# Patient Record
Sex: FEMALE | Race: WHITE | NOT HISPANIC OR LATINO | Employment: STUDENT | URBAN - METROPOLITAN AREA
[De-identification: names, ages, dates, MRNs, and addresses within clinical notes are randomized per-mention and may not be internally consistent; named-entity substitution may affect disease eponyms.]

---
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
## 2023-01-05 NOTE — PROGRESS NOTES
"Subjective:       Patient ID: Kalyn Kirby is a 16 y.o. female.    Vitals:  height is 5' 9" (1.753 m) and weight is 127 kg (280 lb). Her temperature is 98.7 °F (37.1 °C). Her blood pressure is 136/83 and her pulse is 97. Her respiration is 18 and oxygen saturation is 98%.     Chief Complaint: Cough, Sore Throat, Nasal Congestion, and Headache    Yesterday with cough, congestion, headache nausea. siblings with similar sx. No fever, sob. Hx of asthma doesn't need more of her inhaler. No tx yet otc. covid + in jan    Cough  This is a new problem. The current episode started yesterday. The problem has been unchanged. The problem occurs every few minutes. The cough is productive of sputum (clear ). Associated symptoms include headaches, nasal congestion and postnasal drip. Pertinent negatives include no chills, fever, shortness of breath, weight loss or wheezing. Nothing aggravates the symptoms. She has tried nothing for the symptoms.       Constitution: Negative for chills and fever.   HENT: Positive for postnasal drip.    Respiratory: Positive for cough and asthma. Negative for chest tightness, shortness of breath and wheezing.    Gastrointestinal: Positive for nausea.   Allergic/Immunologic: Positive for asthma.   Neurological: Positive for headaches.       Objective:      Physical Exam   Constitutional: She is oriented to person, place, and time. She appears well-developed. She is cooperative.  Non-toxic appearance. She does not appear ill. No distress.   HENT:   Head: Normocephalic and atraumatic.   Ears:   Right Ear: Hearing, tympanic membrane, external ear and ear canal normal. Tympanic membrane is not erythematous. No middle ear effusion.   Left Ear: Hearing, tympanic membrane, external ear and ear canal normal. Tympanic membrane is not erythematous.  No middle ear effusion.   Nose: Nose normal. No mucosal edema, rhinorrhea or nasal deformity. No epistaxis. Right sinus exhibits no maxillary sinus tenderness " and no frontal sinus tenderness. Left sinus exhibits no maxillary sinus tenderness and no frontal sinus tenderness.   Mouth/Throat: Uvula is midline, oropharynx is clear and moist and mucous membranes are normal. No trismus in the jaw. Normal dentition. No uvula swelling. No oropharyngeal exudate, posterior oropharyngeal edema, posterior oropharyngeal erythema, tonsillar abscesses or cobblestoning.   Eyes: Conjunctivae and lids are normal. No scleral icterus.   Neck: Trachea normal and phonation normal. Neck supple. No edema present. No erythema present. No neck rigidity present.   Cardiovascular: Normal rate, regular rhythm, normal heart sounds and normal pulses.   Pulmonary/Chest: Effort normal and breath sounds normal. No accessory muscle usage. No tachypnea. No respiratory distress. She has no decreased breath sounds. She has no wheezes. She has no rhonchi. She has no rales.   NAD able to speak in clear complete sentences without difficulty  Mild cough on exam    Comments: NAD able to speak in clear complete sentences without difficulty  Mild cough on exam    Abdominal: Normal appearance.   Musculoskeletal: Normal range of motion.         General: No deformity. Normal range of motion.   Lymphadenopathy:     She has no cervical adenopathy.   Neurological: She is alert and oriented to person, place, and time. She exhibits normal muscle tone. Coordination normal.   Skin: Skin is warm, dry, intact, not diaphoretic and not pale.   Psychiatric: Her speech is normal and behavior is normal. Judgment and thought content normal.   Nursing note and vitals reviewed.        Results for orders placed or performed in visit on 03/15/22   POCT Influenza A/B MOLECULAR   Result Value Ref Range    POC Molecular Influenza A Ag Negative Negative, Not Reported    POC Molecular Influenza B Ag Negative Negative, Not Reported     Acceptable Yes        Assessment:       1. Viral URI with cough          Plan:         Viral  URI with cough  -     POCT Influenza A/B MOLECULAR  -     benzonatate (TESSALON) 200 MG capsule; Take 1 capsule (200 mg total) by mouth 3 (three) times daily as needed for Cough.  Dispense: 30 capsule; Refill: 0      Patient Instructions   PLEASE READ YOUR DISCHARGE INSTRUCTIONS ENTIRELY AS IT CONTAINS IMPORTANT INFORMATION.      Please drink plenty of fluids.    Please get plenty of rest.    Please return here or go to the Emergency Department for any concerns or worsening of condition.    Please take an over the counter antihistamine medication (allegra/Claritin/Zyrtec) of your choice as directed.    If not allergic, please take over the counter Tylenol (Acetaminophen) and/or Motrin (Ibuprofen) as directed for control of pain and/or fever.  Please follow up with your primary care doctor or specialist as needed.    Sore throat recommendations: Warm fluids, warm salt water gargles, throat lozenges, tea, honey, soup, rest, hydration.    Use over the counter flonase: one spray each nostril twice daily OR two sprays each nostril once daily.     Sinus rinses DO NOT USE TAP WATER, if you must, water must be a rolling boil for 1 minute, let it cool, then use.  May use distilled water, or over the counter nasal saline rinses.  Vics vapor rub in shower to help open nasal passages.  May use nasal gel to keep passages moisturized.  May use Nasal saline sprays during the day for added relief of congestion.   For those who go to the gym, please do not use the sauna or steam room now to clear sinuses.    If you  smoke, please stop smoking.      Please return or see your primary care doctor if you develop new or worsening symptoms.     Please arrange follow up with your primary medical clinic as soon as possible. You must understand that you've received an Urgent Care treatment only and that you may be released before all of your medical problems are known or treated. You, the patient, will arrange for follow up as instructed. If  your symptoms worsen or fail to improve you should go to the Emergency Room.                          Dressing: pressure dressing with telfa

## 2023-02-09 ENCOUNTER — OFFICE VISIT (OUTPATIENT)
Dept: URGENT CARE | Facility: CLINIC | Age: 18
End: 2023-02-09
Payer: COMMERCIAL

## 2023-02-09 VITALS
HEART RATE: 93 BPM | RESPIRATION RATE: 16 BRPM | BODY MASS INDEX: 41.47 KG/M2 | OXYGEN SATURATION: 98 % | SYSTOLIC BLOOD PRESSURE: 115 MMHG | WEIGHT: 280 LBS | TEMPERATURE: 98 F | HEIGHT: 69 IN | DIASTOLIC BLOOD PRESSURE: 79 MMHG

## 2023-02-09 DIAGNOSIS — J02.9 SORE THROAT: ICD-10-CM

## 2023-02-09 DIAGNOSIS — R50.9 FEVER, UNSPECIFIED FEVER CAUSE: ICD-10-CM

## 2023-02-09 DIAGNOSIS — R05.9 COUGH, UNSPECIFIED TYPE: ICD-10-CM

## 2023-02-09 DIAGNOSIS — J06.9 UPPER RESPIRATORY TRACT INFECTION, UNSPECIFIED TYPE: Primary | ICD-10-CM

## 2023-02-09 LAB
CTP QC/QA: YES
CTP QC/QA: YES
POC MOLECULAR INFLUENZA A AGN: NEGATIVE
POC MOLECULAR INFLUENZA B AGN: NEGATIVE
SARS-COV-2 AG RESP QL IA.RAPID: NEGATIVE

## 2023-02-09 PROCEDURE — 3074F PR MOST RECENT SYSTOLIC BLOOD PRESSURE < 130 MM HG: ICD-10-PCS | Mod: CPTII,S$GLB,, | Performed by: NURSE PRACTITIONER

## 2023-02-09 PROCEDURE — 3008F BODY MASS INDEX DOCD: CPT | Mod: CPTII,S$GLB,, | Performed by: NURSE PRACTITIONER

## 2023-02-09 PROCEDURE — 1159F PR MEDICATION LIST DOCUMENTED IN MEDICAL RECORD: ICD-10-PCS | Mod: CPTII,S$GLB,, | Performed by: NURSE PRACTITIONER

## 2023-02-09 PROCEDURE — 3078F PR MOST RECENT DIASTOLIC BLOOD PRESSURE < 80 MM HG: ICD-10-PCS | Mod: CPTII,S$GLB,, | Performed by: NURSE PRACTITIONER

## 2023-02-09 PROCEDURE — 87502 INFLUENZA DNA AMP PROBE: CPT | Mod: QW,S$GLB,, | Performed by: NURSE PRACTITIONER

## 2023-02-09 PROCEDURE — 99213 OFFICE O/P EST LOW 20 MIN: CPT | Mod: S$GLB,,, | Performed by: NURSE PRACTITIONER

## 2023-02-09 PROCEDURE — 99213 PR OFFICE/OUTPT VISIT, EST, LEVL III, 20-29 MIN: ICD-10-PCS | Mod: S$GLB,,, | Performed by: NURSE PRACTITIONER

## 2023-02-09 PROCEDURE — 87811 SARS CORONAVIRUS 2 ANTIGEN POCT, MANUAL READ: ICD-10-PCS | Mod: QW,S$GLB,, | Performed by: NURSE PRACTITIONER

## 2023-02-09 PROCEDURE — 3074F SYST BP LT 130 MM HG: CPT | Mod: CPTII,S$GLB,, | Performed by: NURSE PRACTITIONER

## 2023-02-09 PROCEDURE — 1160F RVW MEDS BY RX/DR IN RCRD: CPT | Mod: CPTII,S$GLB,, | Performed by: NURSE PRACTITIONER

## 2023-02-09 PROCEDURE — 1159F MED LIST DOCD IN RCRD: CPT | Mod: CPTII,S$GLB,, | Performed by: NURSE PRACTITIONER

## 2023-02-09 PROCEDURE — 1160F PR REVIEW ALL MEDS BY PRESCRIBER/CLIN PHARMACIST DOCUMENTED: ICD-10-PCS | Mod: CPTII,S$GLB,, | Performed by: NURSE PRACTITIONER

## 2023-02-09 PROCEDURE — 87811 SARS-COV-2 COVID19 W/OPTIC: CPT | Mod: QW,S$GLB,, | Performed by: NURSE PRACTITIONER

## 2023-02-09 PROCEDURE — 87502 POCT INFLUENZA A/B MOLECULAR: ICD-10-PCS | Mod: QW,S$GLB,, | Performed by: NURSE PRACTITIONER

## 2023-02-09 PROCEDURE — 3078F DIAST BP <80 MM HG: CPT | Mod: CPTII,S$GLB,, | Performed by: NURSE PRACTITIONER

## 2023-02-09 PROCEDURE — 3008F PR BODY MASS INDEX (BMI) DOCUMENTED: ICD-10-PCS | Mod: CPTII,S$GLB,, | Performed by: NURSE PRACTITIONER

## 2023-02-09 RX ORDER — BENZONATATE 100 MG/1
100 CAPSULE ORAL 3 TIMES DAILY PRN
Qty: 30 CAPSULE | Refills: 0 | Status: SHIPPED | OUTPATIENT
Start: 2023-02-09 | End: 2023-02-19

## 2023-02-09 RX ORDER — FLUTICASONE PROPIONATE 50 MCG
2 SPRAY, SUSPENSION (ML) NASAL DAILY
Qty: 16 G | Refills: 0 | Status: SHIPPED | OUTPATIENT
Start: 2023-02-09 | End: 2023-03-28

## 2023-02-09 RX ORDER — CETIRIZINE HYDROCHLORIDE 10 MG/1
10 TABLET ORAL DAILY
Qty: 30 TABLET | Refills: 0 | Status: SHIPPED | OUTPATIENT
Start: 2023-02-09 | End: 2023-03-28

## 2023-02-09 NOTE — PROGRESS NOTES
"Subjective:       Patient ID: Kalyn Kirby is a 17 y.o. female.    Vitals:  height is 5' 9" (1.753 m) and weight is 127 kg (280 lb). Her temperature is 98 °F (36.7 °C). Her blood pressure is 115/79 and her pulse is 93. Her respiration is 16 and oxygen saturation is 98%.     Chief Complaint: Sore Throat    18yo female pt reports sore throat, nasal/sinus congestion, headache, cough, and nausea x3 days.  Reports Tmax of 101.9F at onset of symptoms, responsive to Tylenol and has not re-occurred.  Denies vomiting or diarrhea, denies chest pain, wheezing, or SOB.  Denies known sick contacts.  Reports receiving both COVID and flu vaccinations.  Reports no improvement with Tylenol.    Sore Throat   This is a new problem. The current episode started in the past 7 days. The problem has been gradually worsening. The pain is worse on the left side. There has been no fever. The pain is at a severity of 7/10. The pain is moderate. Associated symptoms include congestion, coughing and headaches. Pertinent negatives include no abdominal pain, diarrhea, shortness of breath or vomiting. She has had no exposure to strep or mono. She has tried acetaminophen for the symptoms. The treatment provided no relief.     Constitution: Positive for chills and fever.   HENT:  Positive for congestion, postnasal drip and sore throat.    Cardiovascular:  Negative for chest pain.   Respiratory:  Positive for cough. Negative for chest tightness, sputum production, shortness of breath and wheezing.    Gastrointestinal:  Positive for nausea. Negative for abdominal pain, vomiting and diarrhea.   Neurological:  Positive for headaches.     Objective:      Physical Exam   Constitutional: She is oriented to person, place, and time. She appears well-developed. She is cooperative.  Non-toxic appearance. She does not appear ill. No distress.   HENT:   Head: Normocephalic and atraumatic.   Ears:   Right Ear: Hearing, external ear and ear canal normal. " Tympanic membrane is bulging. Tympanic membrane is not erythematous and not retracted. A middle ear effusion (clear fluid) is present.   Left Ear: Hearing, external ear and ear canal normal. Tympanic membrane is bulging. Tympanic membrane is not erythematous and not retracted. A middle ear effusion (clear fluid) is present.   Nose: Mucosal edema (erythema and swelling to BL turbinates) and rhinorrhea (clear to BL nares) present. No purulent discharge or nasal deformity. No epistaxis. Right sinus exhibits no maxillary sinus tenderness and no frontal sinus tenderness. Left sinus exhibits no maxillary sinus tenderness and no frontal sinus tenderness.   Mouth/Throat: Uvula is midline and mucous membranes are normal. No trismus in the jaw. Normal dentition. No uvula swelling. Oropharyngeal exudate (clear postnasal drip) present. No posterior oropharyngeal edema or posterior oropharyngeal erythema. Tonsils are 0 on the right. Tonsils are 0 on the left. No tonsillar exudate (tonsils surgically removed).   Eyes: Conjunctivae and lids are normal. No scleral icterus.   Neck: Trachea normal and phonation normal. Neck supple. No edema present. No erythema present. No neck rigidity present.   Cardiovascular: Normal rate, regular rhythm, normal heart sounds and normal pulses.   Pulmonary/Chest: Effort normal and breath sounds normal. No accessory muscle usage or stridor. No tachypnea. No respiratory distress. She has no decreased breath sounds. She has no wheezes. She has no rhonchi. She has no rales.   Dry cough witnessed on exam.         Comments: Dry cough witnessed on exam.    Abdominal: Normal appearance.   Musculoskeletal: Normal range of motion.         General: No deformity. Normal range of motion.   Lymphadenopathy:        Head (right side): No submandibular adenopathy present.        Head (left side): No submandibular adenopathy present.     She has no cervical adenopathy.   Neurological: She is alert and oriented to  person, place, and time. She exhibits normal muscle tone. Coordination normal.   Skin: Skin is warm, dry, intact, not diaphoretic and not pale.   Psychiatric: Her speech is normal and behavior is normal. Judgment and thought content normal.   Nursing note and vitals reviewed.    Results for orders placed or performed in visit on 02/09/23   SARS Coronavirus 2 Antigen, POCT Manual Read   Result Value Ref Range    SARS Coronavirus 2 Antigen Negative Negative     Acceptable Yes    POCT Influenza A/B MOLECULAR   Result Value Ref Range    POC Molecular Influenza A Ag Negative Negative, Not Reported    POC Molecular Influenza B Ag Negative Negative, Not Reported     Acceptable Yes            Assessment:       1. Upper respiratory tract infection, unspecified type    2. Fever, unspecified fever cause    3. Cough, unspecified type    4. Sore throat          Plan:       Provided education on prescribed medications, recommended continuing Tylenol/ibuprofen for elevated temperatures, if needed.  Recommended re-testing for COVID in 2-3 days if symptoms do not improve.  Provided education on return/ER precautions.  Pt verbalized understanding and agreed to plan.      Upper respiratory tract infection, unspecified type  -     cetirizine (ZYRTEC) 10 MG tablet; Take 1 tablet (10 mg total) by mouth once daily.  Dispense: 30 tablet; Refill: 0  -     fluticasone propionate (FLONASE) 50 mcg/actuation nasal spray; Spray 2 sprays in Each Nostril once daily.  Dispense: 16 g; Refill: 0    Fever, unspecified fever cause  -     SARS Coronavirus 2 Antigen, POCT Manual Read  -     POCT Influenza A/B MOLECULAR    Cough, unspecified type  -     benzonatate (TESSALON) 100 MG capsule; Take 1 capsule (100 mg total) by mouth 3 (three) times daily as needed for Cough.  Dispense: 30 capsule; Refill: 0    Sore throat  -     magic mouthwash diphen/antac/lidoc; Swish and spit 10 mLs every 4 (four) hours as needed (throat  "pain).  Dispense: 360 mL; Refill: 0      Patient Instructions   If your condition worsens or fails to improve, we recommend that you receive another evaluation at the ER immediately contact your PCP to discuss your concerns, or return here.  You must understand that you've received an urgent care treatment only, and that you may be released before all your medical problems are known or treated.  You, the patient, will arrange for follow-up care as instructed.     If we discussed that I think your illness is viral, it will not respond to antibiotics and will last 10-14 days.  If we discussed "wait and see" antibiotics, and if over the next few days the symptoms worsen, start the antibiotics I have given you.     If you are female and on birth control pills and do take the antibiotics, use additional methods to prevent pregnancy while on the antibiotics and for one cycle after.     Flonase (fluticasone) is a nasal spray which is available over the counter and may help with your symptoms.  Zyrtec D, Claritin D, or Allegra D can also help with symptoms of congestion and drainage.  If you have hypertension, avoid using the "D" which is the decongestant formula.    If you just have drainage, you can take plain Zyrtec, Claritin, or Allegra.  If you just have a congested feeling, you can take pseudoephedrine (unless you have high blood pressure), which you have to sign for behind the counter.  Do not buy phenylephrine OTC, as it is not effective.    Rest and fluids are also important.  Tylenol or ibuprofen can also be used as directed for pain, unless you have an allergy to them or medical condition (such as stomach ulcers, kidney or liver disease, or use blood thinners, etc.) for which you should not be taking these type of medications.     If you are flying in the next few days, Afrin nose drops for the airplane flight upon take off and landing may help.  Other than at those times, refrain from using Afrin.     If you " were prescribed a narcotic or sedating cough medicine, do not drive or operate heavy machinery while taking these medications.

## 2023-02-16 ENCOUNTER — PATIENT MESSAGE (OUTPATIENT)
Dept: NEUROLOGY | Facility: CLINIC | Age: 18
End: 2023-02-16
Payer: COMMERCIAL

## 2023-03-03 DIAGNOSIS — K21.9 LPRD (LARYNGOPHARYNGEAL REFLUX DISEASE): ICD-10-CM

## 2023-03-03 DIAGNOSIS — G43.909 MIGRAINE WITHOUT STATUS MIGRAINOSUS, NOT INTRACTABLE, UNSPECIFIED MIGRAINE TYPE: ICD-10-CM

## 2023-03-03 RX ORDER — OMEPRAZOLE 40 MG/1
40 CAPSULE, DELAYED RELEASE ORAL EVERY MORNING
Qty: 90 CAPSULE | Refills: 2 | Status: SHIPPED | OUTPATIENT
Start: 2023-03-03 | End: 2023-11-09 | Stop reason: SDUPTHER

## 2023-03-03 RX ORDER — TOPIRAMATE 25 MG/1
25 TABLET ORAL NIGHTLY
Qty: 90 TABLET | Refills: 1 | Status: SHIPPED | OUTPATIENT
Start: 2023-03-03 | End: 2023-03-28 | Stop reason: ALTCHOICE

## 2023-03-20 ENCOUNTER — OFFICE VISIT (OUTPATIENT)
Dept: OPTOMETRY | Facility: CLINIC | Age: 18
End: 2023-03-20
Payer: COMMERCIAL

## 2023-03-20 DIAGNOSIS — Z97.3 WEARS CONTACT LENSES: ICD-10-CM

## 2023-03-20 DIAGNOSIS — Z46.0 FITTING AND ADJUSTMENT OF SPECTACLES AND CONTACT LENSES: Primary | ICD-10-CM

## 2023-03-20 DIAGNOSIS — H52.13 MYOPIA, BILATERAL: Primary | ICD-10-CM

## 2023-03-20 DIAGNOSIS — G51.4 EYELID MYOKYMIA: ICD-10-CM

## 2023-03-20 PROCEDURE — 92310 CONTACT LENS FITTING OU: CPT | Mod: S$GLB,,, | Performed by: OPTOMETRIST

## 2023-03-20 PROCEDURE — 92310 PR CONTACT LENS FITTING (NO CHANGE): ICD-10-PCS | Mod: S$GLB,,, | Performed by: OPTOMETRIST

## 2023-03-20 PROCEDURE — 92014 COMPRE OPH EXAM EST PT 1/>: CPT | Mod: S$GLB,,, | Performed by: OPTOMETRIST

## 2023-03-20 PROCEDURE — 99999 PR PBB SHADOW E&M-EST. PATIENT-LVL III: ICD-10-PCS | Mod: PBBFAC,,, | Performed by: OPTOMETRIST

## 2023-03-20 PROCEDURE — 99999 PR PBB SHADOW E&M-EST. PATIENT-LVL I: CPT | Mod: PBBFAC,,, | Performed by: OPTOMETRIST

## 2023-03-20 PROCEDURE — 92015 PR REFRACTION: ICD-10-PCS | Mod: S$GLB,,, | Performed by: OPTOMETRIST

## 2023-03-20 PROCEDURE — 99999 PR PBB SHADOW E&M-EST. PATIENT-LVL I: ICD-10-PCS | Mod: PBBFAC,,, | Performed by: OPTOMETRIST

## 2023-03-20 PROCEDURE — 92014 PR EYE EXAM, EST PATIENT,COMPREHESV: ICD-10-PCS | Mod: S$GLB,,, | Performed by: OPTOMETRIST

## 2023-03-20 PROCEDURE — 92015 DETERMINE REFRACTIVE STATE: CPT | Mod: S$GLB,,, | Performed by: OPTOMETRIST

## 2023-03-20 PROCEDURE — 99999 PR PBB SHADOW E&M-EST. PATIENT-LVL III: CPT | Mod: PBBFAC,,, | Performed by: OPTOMETRIST

## 2023-03-20 NOTE — PROGRESS NOTES
HPI    DSL- 1/28/2022 Dr. Magdaleno    18 y/o female present to clinic for routine eye exam. H/o of Astigmatism.   Pt present to clinic for concerns of blurry vision with/without glasses.   Occasional burning eye. Onset of twitching eyelid, LT eye X 1 week. She is   light/glare sensitive. No floaters or flashes of light reported.     Eyemeds  No gtts   Last edited by Niki Deal on 3/20/2023  1:17 PM.            Assessment /Plan     For exam results, see Encounter Report.    Myopia, bilateral    Eyelid myokymia    Wears contact lenses      Rx specs  Dispsensed trials of oasys dailies  Remove nightly, replace daily  Ok to order if happy with vision and comfort oU    RTC 1 year

## 2023-03-28 ENCOUNTER — OFFICE VISIT (OUTPATIENT)
Dept: NEUROLOGY | Facility: CLINIC | Age: 18
End: 2023-03-28
Payer: COMMERCIAL

## 2023-03-28 VITALS
WEIGHT: 293 LBS | BODY MASS INDEX: 43.4 KG/M2 | HEIGHT: 69 IN | SYSTOLIC BLOOD PRESSURE: 130 MMHG | HEART RATE: 73 BPM | DIASTOLIC BLOOD PRESSURE: 81 MMHG | RESPIRATION RATE: 17 BRPM | TEMPERATURE: 98 F

## 2023-03-28 DIAGNOSIS — G44.52 NEW DAILY PERSISTENT HEADACHE: ICD-10-CM

## 2023-03-28 DIAGNOSIS — G43.719 CHRONIC MIGRAINE WITHOUT AURA, INTRACTABLE, WITHOUT STATUS MIGRAINOSUS: Primary | ICD-10-CM

## 2023-03-28 PROCEDURE — 3008F PR BODY MASS INDEX (BMI) DOCUMENTED: ICD-10-PCS | Mod: CPTII,S$GLB,, | Performed by: PSYCHIATRY & NEUROLOGY

## 2023-03-28 PROCEDURE — 3079F DIAST BP 80-89 MM HG: CPT | Mod: CPTII,S$GLB,, | Performed by: PSYCHIATRY & NEUROLOGY

## 2023-03-28 PROCEDURE — 99205 PR OFFICE/OUTPT VISIT, NEW, LEVL V, 60-74 MIN: ICD-10-PCS | Mod: S$GLB,,, | Performed by: PSYCHIATRY & NEUROLOGY

## 2023-03-28 PROCEDURE — 99999 PR PBB SHADOW E&M-EST. PATIENT-LVL IV: ICD-10-PCS | Mod: PBBFAC,,, | Performed by: PSYCHIATRY & NEUROLOGY

## 2023-03-28 PROCEDURE — 1159F PR MEDICATION LIST DOCUMENTED IN MEDICAL RECORD: ICD-10-PCS | Mod: CPTII,S$GLB,, | Performed by: PSYCHIATRY & NEUROLOGY

## 2023-03-28 PROCEDURE — 1159F MED LIST DOCD IN RCRD: CPT | Mod: CPTII,S$GLB,, | Performed by: PSYCHIATRY & NEUROLOGY

## 2023-03-28 PROCEDURE — 3079F PR MOST RECENT DIASTOLIC BLOOD PRESSURE 80-89 MM HG: ICD-10-PCS | Mod: CPTII,S$GLB,, | Performed by: PSYCHIATRY & NEUROLOGY

## 2023-03-28 PROCEDURE — 99205 OFFICE O/P NEW HI 60 MIN: CPT | Mod: S$GLB,,, | Performed by: PSYCHIATRY & NEUROLOGY

## 2023-03-28 PROCEDURE — 3008F BODY MASS INDEX DOCD: CPT | Mod: CPTII,S$GLB,, | Performed by: PSYCHIATRY & NEUROLOGY

## 2023-03-28 PROCEDURE — 3075F SYST BP GE 130 - 139MM HG: CPT | Mod: CPTII,S$GLB,, | Performed by: PSYCHIATRY & NEUROLOGY

## 2023-03-28 PROCEDURE — 3075F PR MOST RECENT SYSTOLIC BLOOD PRESS GE 130-139MM HG: ICD-10-PCS | Mod: CPTII,S$GLB,, | Performed by: PSYCHIATRY & NEUROLOGY

## 2023-03-28 PROCEDURE — 99999 PR PBB SHADOW E&M-EST. PATIENT-LVL IV: CPT | Mod: PBBFAC,,, | Performed by: PSYCHIATRY & NEUROLOGY

## 2023-03-28 RX ORDER — DICLOFENAC POTASSIUM 50 MG/1
1 POWDER, FOR SOLUTION ORAL DAILY PRN
Qty: 9 EACH | Refills: 5 | Status: SHIPPED | OUTPATIENT
Start: 2023-03-28 | End: 2024-02-01

## 2023-03-28 RX ORDER — DIHYDROERGOTAMINE MESYLATE 4 MG/ML
0.72 SPRAY, METERED NASAL DAILY PRN
Qty: 8 ML | Refills: 11 | Status: SHIPPED | OUTPATIENT
Start: 2023-03-28 | End: 2023-11-28

## 2023-03-28 NOTE — PROGRESS NOTES
Headache questionnaire    1. When did your Headaches start?    AROUND 2013 (8 YEARS OLD)      2. Where are your headaches located?   MOSTLY FRONTAL      3. Your headache's characteristics:    Pressure, Throbbing, Pounding,  Like a tight band, Sharp      4. How long does the headache last?   HOURS      5. How often does the headache occur?   daily      6. Are your headaches preceded or accompanied by other symptoms? yes   If yes, please describe.  NAUSEA LIGHT SENTSITIVITY      7. Does the headache awaken you at night? yes   If so, how often? ONCE A WEEK        8. Please ehsan the word that best describes your headache's intensity:    moderate      9. Using a scale of 1 through 10, with 0 = no pain and 10 = the worst pain:   What score is your headache now? 6   What score is your headache at its worst? 8   What score is your headache at its best? 3        10. Possible associated headache symptoms:  [x]  Sensitivity to light  [x] Dizziness  [] Nasal or sinus pressure/ pain   [x] Sensitivity to noise  [] Vertigo  [x] Problems with concentration  [] Sensitivity to smells  [x] Ringing in ears  [] Problems with memory    [x] Blurred vision  [] Irritability  [] Problems with task completion   [] Double vision  [] Anger  [x]  Problems with relaxation  [x] Loss of appetite  [] Anxiety  [x] Neck tightness, Neck pain  [x] Nausea   [x] Nasal congestion  [] Vomiting         11. Headache improving factors:  [x] Sleep  [] Heat  [x] Darkness  [x] Ice  [] Local pressure [] Menses (period)  [] Massage   [] Medications:        12. Headache worsening factors:   [] Fatigue [x] Sneezing  [x] Changes in Weather  [x] Light [] Bending Over [] Stress  [x] Noise [] Ovulation  [] Multiple Sclerosis Flare-Up  [] Smells  [] Menses  [] Food   [] Coughing [] Alcohol      13. Number of caffeinated drinks per day: 1 EVERY OTHER DAY      14. Number of diet drinks per day:  1 EVERY OTHER WEEK      15. Have you seen any other [facility]  within the last 3 years?  no     Please Check any Medications or Procedures tried/failed for Headache    AED Neuromodulators  MAOIs  Ergot Alkaloids    Acetazolamide (Diamox) [] Phenelzine (Nardil) [] Dihydroergotamine (Migranal) []   Carbamazepine (Tegretol) [] Tranylcypromine (Parnate) [] Ergotamine (Ergomar) []   Gabapentin (Neurontin) [] Antihistamine/Serotonergic  Triptans    Lacosamide (Vimpat) [] Cyproheptadine (Periactin) [] Almotriptan (Axert) []   Lamotrigine (Lamictal) [] Antihypertensives  Eletriptan (Relpax) []   Levatiracetam (Keppra) [] Atenolol (Tenormin) [] Frovatriptan (Frova) []   Oxcarbazepine (Trileptal) [] Bisoprostol (Zebeta) [] Naratriptan (Amerge) []   Phenobarbital [] Candesartan (Atacand) [] Rizatriptan (Maxalt) []     Nebivolol (Bystolic)  Sumatriptan (Imitrex) [x]   Levetiracetam (Keppra)  Cardeilol (Coreg) [] Zolmitriptan (Zomig) []   Phenytoin (Dilantin) [] Diltiazem (Cardizem) []     Pregabalin (Lyrica) [] Lisinopril (Prinivil, Zestril) [] Combo Abortives    Primidone (Mysoline) [] Metoprolol (Toprol) [] BC Powder []   Tiagabine (Gabatril) [] Nadolol (Corgard) [] Butalbital and Acetaminophen (Bupap) []   Topiramate (Topamax)  (Trokendi) [x] Nicardipine (Cardene) []     Vigabatrin (Sabril) [] Nimodipine (Nimotop) [] Butalbital, Acetaminophen, and caffeine (Fioricet) [x]   Valproic Acid (Depakote) (Divalproex Sodium) [] Propranolol (Inderal) []     Zonisamide (Zonegran) [] Telmisartan (Micardis) [] Butalbital, Aspirin, and caffeine (Fiorinal) []   Benzodiazepines  Timolol (Blocadren) []     Alprazolam (Xanax) [x] Verapamil (Calan, Verelan) [] Butalbital, Caffeine, Acetaminophen, and Codeine (Fioricet with Codeine) []   Diazepam (Valium) [] NSAIDs      Lorazepam (Ativan) [] Acetaminophen (Tylenol) [x]     Clonazepam (Klonopin) [] Acetylsalicylic Acid (Aspirin) [] Butalbital, Caffeine, Aspirin, and Codeine  (Fiorinal with Codeine) []   Antidepressants  Diclofenac (Cambia) []      Amitriptyline (Elavil) [x] Ibuprofen (Motrin) [x]     Desipramine (Norpramin) [] Indomethacin (Indocin) [] Aspirin, Caffeine, and Acetaminophen (Excedrin) (Goodys) []   Doxepin (Sinequan) [] Ketoprofen (Orudis) []     Fluoxetine (Prozac) [] Ketorolac (Toradol) [] Acetaminophen, Dichloralphenazone, and Isometheptene (Midrin) []   Imipramine (Tofranil) [] Naproxen (Anaprox) (Aleve) [x]     Nortriptyline (Pamelor) [] Meclofenamic Acid (Meclomen) []     Venlafaxine (Effexor) [] Meloxicam (Mobic) [] Procedures    Desvenlafazine (Pristiq) [] Monoclonals  Greater occipital nerve block []   Duloxetine (Cymbalta) [] Eptinezumab [] Cervical, Thoracic, Lumbar radiofrequency ablation []   Trazadone [] Erenumab-aooe (Aimovig) [] Spenopalatine ganglion block []   Wellbutrin [x] Galcanezumab (Emgality) [] Occipital neuro stimulation []   Protriptyline (Vivactil) [] Fremanazumab-vfrm (Ajovy)  Cervical, Thoracic, Lumbar, Caudal Epidural steroid injection []   Escitalopram (Lexapro) [] Other [] Sacroiliac joint steroid injection []   Celexa [] Memantine (Namenda) [] Transforaminal epidural steroid injection []     Botox [] Facet joint injections []     Baclofen (Lioresal) [] Cervical, Thoracic, Lumbar medial branch blocks []   UBRELVY X   Cefaly []       Gamma Core []       Iovera []       Transcranial Magnetic stimulation []

## 2023-03-28 NOTE — TELEPHONE ENCOUNTER
Please notify Kalyn's parent that her flu test was negative.  I recommend she take Ibuprofen 600 mg every 6 hours as needed for fever/pain.  She can take the Phenergan every 6 hours as needed for nausea.  Make sure she is drinking plenty of fluids and is getting plenty of rest.  If fever does not resolve over the next 72 hours, or she develops any new symptoms, please let me know.  Thanks!  
notified mom of lab results and POC per Dr. Soares  
Heterosexual

## 2023-03-28 NOTE — PROGRESS NOTES
Subjective:       Patient ID: Kalyn Kirby is a 17 y.o. female.    Chief Complaint: Headache    The patient is a 16 y/o female with long history of migraine, previously seen by a child neurologist last in 2017 who is retired now. She reports having been treated with a medication that also works for anxiety. She used topamax which initially helped but her headaches have started to increase in frequency. She has also tried Ubrelvy and Rizatriptan (both of which were supplied by the patient's mom). Ubrelvy and rizatriptan lowers the intensity of the headache.    Headaches are bifrontal, pressure like, throbbing. No particular time of the day, lasting hours to days.     In the past 30 days, 2 headache-FREE days, 28 HA days (mostly moderate 5-7/10, 4-5 instances of severe 8-9/10 headaches). They've become daily in the past month and prior to that she had 4-5     Strong maternal history of migraines, with multiple siblings living with migraines.      Headache History:  Headache questionnaire    Headache Impact Test (HIT-6): 55    1. When did your Headaches start?    Approximately 2013 (age of 8)    2. Where are your headaches located?   Mostly frontal, bilaterally    3. Your headache's characteristics:   Pressure, Like a tight band, Throbbing/Pounding, Sharp    4. How long does the headache last?   Hours-Days     5. How often does the headache occur?   daily    6. Are your headaches preceded or accompanied by other symptoms? yes   If yes, please describe.  Nausea, light sensitivity      7. Does the headache awaken you at night? yes   If so, how often? Once a week    8. Please ehsan the word that best describes your headache's intensity:    moderate    9. Using a scale of 1 through 10, with 0 = no pain and 10 = the worst pain:   What score is your headache now? 6   What score is your headache at its worst? 8   What score is your headache at its best? 3        10. Possible associated headache symptoms:  [x]   Sensitivity to light  [x] Dizziness  [] Nasal or sinus pressure/ pain   [x] Sensitivity to noise  [] Vertigo  [x] Problems with concentration  [] Sensitivity to smells  [x] Ringing in ears  [] Problems with memory    [x] Blurred vision  [] Irritability  [] Problems with task completion   [] Double vision  [] Anger  [x]  Problems with relaxation  [x] Loss of appetite  [] Anxiety  [x] Neck tightness, Neck pain  [x] Nausea   [x] Nasal congestion  [] Vomiting         11. Headache improving factors:  [x] Sleep  [] Heat  [x] Darkness  [x] Ice  [] Local pressure [] Menses (period)  [] Massage   [] Medications:        12. Headache worsening factors:   [] Fatigue [x] Sneezing  [x] Changes in Weather  [x] Light [] Bending Over [] Stress  [x] Noise [] Ovulation  [] Multiple Sclerosis Flare-Up  [] Smells  [] Menses  [] Food   [] Coughing [] Alcohol      13. Number of caffeinated drinks per day: 1 every other day      14. Number of diet drinks per day:  1 every other week      15. Have you seen any other [facility] within the last 3 years?  no    Please Check any Medications or Procedures tried/failed for Headache    AED Neuromodulators  MAOIs  Ergot Alkaloids    Acetazolamide (Diamox) [] Phenelzine (Nardil) [] Dihydroergotamine (Migranal) []   Carbamazepine (Tegretol) [] Tranylcypromine (Parnate) [] Ergotamine (Ergomar) []   Gabapentin (Neurontin) [] Antihistamine/Serotonergic  Triptans    Lacosamide (Vimpat) [] Cyproheptadine (Periactin) [] Almotriptan (Axert) []   Lamotrigine (Lamictal) [] Antihypertensives  Eletriptan (Relpax) []   Levatiracetam (Keppra) [] Atenolol (Tenormin) [] Frovatriptan (Frova) []   Oxcarbazepine (Trileptal) [] Bisoprostol (Zebeta) [] Naratriptan (Amerge) []   Phenobarbital [] Candesartan (Atacand) [] Rizatriptan (Maxalt) []     Nebivolol (Bystolic)  Sumatriptan (Imitrex) [x]   Levetiracetam (Keppra)  Cardeilol (Coreg) [] Zolmitriptan (Zomig) []   Phenytoin (Dilantin) [] Diltiazem (Cardizem)  []     Pregabalin (Lyrica) [] Lisinopril (Prinivil, Zestril) [] Combo Abortives    Primidone (Mysoline) [] Metoprolol (Toprol) [] BC Powder []   Tiagabine (Gabatril) [] Nadolol (Corgard) [] Butalbital and Acetaminophen (Bupap) []   Topiramate (Topamax)  (Trokendi) [x] Nicardipine (Cardene) []     Vigabatrin (Sabril) [] Nimodipine (Nimotop) [] Butalbital, Acetaminophen, and caffeine (Fioricet) []   Valproic Acid (Depakote) (Divalproex Sodium) [] Propranolol (Inderal) []     Zonisamide (Zonegran) [] Telmisartan (Micardis) [] Butalbital, Aspirin, and caffeine (Fiorinal) []   Benzodiazepines  Timolol (Blocadren) []     Alprazolam (Xanax) [x] Verapamil (Calan, Verelan) [] Butalbital, Caffeine, Acetaminophen, and Codeine (Fioricet with Codeine) []   Diazepam (Valium) [] NSAIDs      Lorazepam (Ativan) [] Acetaminophen (Tylenol) [x]     Clonazepam (Klonopin) [] Acetylsalicylic Acid (Aspirin) [] Butalbital, Caffeine, Aspirin, and Codeine  (Fiorinal with Codeine) []   Antidepressants  Diclofenac (Cambia) []     Amitriptyline (Elavil) [x] Ibuprofen (Motrin) [x]     Desipramine (Norpramin) [] Indomethacin (Indocin) [] Aspirin, Caffeine, and Acetaminophen (Excedrin) (Goodys) []   Doxepin (Sinequan) [] Ketoprofen (Orudis) []     Fluoxetine (Prozac) [] Ketorolac (Toradol) [x] Acetaminophen, Dichloralphenazone, and Isometheptene (Midrin) []   Imipramine (Tofranil) [] Naproxen (Anaprox) (Aleve) [x]     Nortriptyline (Pamelor) [] Meclofenamic Acid (Meclomen) []     Venlafaxine (Effexor) [] Meloxicam (Mobic) [] Procedures    Desvenlafazine (Pristiq) [] Monoclonals  Greater occipital nerve block []   Duloxetine (Cymbalta) [] Eptinezumab [] Cervical, Thoracic, Lumbar radiofrequency ablation []   Trazadone [] Erenumab-aooe (Aimovig) [] Spenopalatine ganglion block []   Wellbutrin [x] Galcanezumab (Emgality) [] Occipital neuro stimulation []   Protriptyline (Vivactil) [] Fremanazumab-vfrm (Ajovy)  Cervical, Thoracic, Lumbar, Caudal  Epidural steroid injection []   Escitalopram (Lexapro) [] Other [] Sacroiliac joint steroid injection []   Celexa [] Memantine (Namenda) [] Transforaminal epidural steroid injection []   Ubrelvy [] Botox [] Facet joint injections []     Baclofen (Lioresal) [] Cervical, Thoracic, Lumbar medial branch blocks []       Cefaly []       Gamma Core []       Iovera []       Transcranial Magnetic stimulation []                         Review of Systems:  12 system review of systems is negative except for the symptoms mentioned in HPI.        Past Medical History:   Diagnosis Date    Allergy     Asthma     Cough     Eczema     Headache(784.0)     Obesity     Rhinitis     seasonal    Snoring      Past Surgical History:   Procedure Laterality Date    ADENOIDECTOMY      TONSILLECTOMY      TYMPANOSTOMY TUBE PLACEMENT       Family History   Problem Relation Age of Onset    Birth defects Mother         Myelomeningocele    Hypertension Mother     Arthritis Mother     Miscarriages / Stillbirths Mother     Allergies Mother     Rhinitis Mother     Urticaria Mother     Cancer Mother         Bilateral paillary thyroud cancer stage 1    Depression Mother         ADD, anxiety disorder    Stroke Mother         Ischemic stroke    Hypertension Father     Tourette syndrome Father     Allergies Father     Rhinitis Father     Melanoma Father     Alcohol abuse Maternal Aunt     Learning disabilities Maternal Aunt     Alcohol abuse Paternal Uncle     Learning disabilities Paternal Uncle     Depression Paternal Uncle         alcoholic, drug user, manic depression, personality disorder    Drug abuse Paternal Uncle         Pills    Cancer Maternal Grandmother         Cervical cancer and esophagus cancer stage four    Hypertension Maternal Grandmother     Cancer Paternal Grandfather         anaplastic thyroid cancer    Allergies Brother     Rhinitis Brother     Hypertension Paternal Grandmother     Blindness Cousin     Alcohol abuse Maternal Aunt      Depression Maternal Aunt         Substance abuse and depression    Drug abuse Maternal Aunt         Heroin    Depression Brother         ADD and Anxiety    Vision loss Sister         Optic nerve hypoplasia    Amblyopia Neg Hx     Cataracts Neg Hx     Diabetes Neg Hx     Glaucoma Neg Hx     Retinal detachment Neg Hx     Strabismus Neg Hx     Angioedema Neg Hx     Asthma Neg Hx     Eczema Neg Hx     Immunodeficiency Neg Hx     Breast cancer Neg Hx     Colon cancer Neg Hx     Ovarian cancer Neg Hx      Social History     Socioeconomic History    Marital status: Single   Occupational History    Occupation: student   Tobacco Use    Smoking status: Never    Smokeless tobacco: Never    Tobacco comments:     MGM smokes   Substance and Sexual Activity    Alcohol use: Never    Drug use: Never    Sexual activity: Never   Social History Narrative    Kalyn lives with her parents (mom - Trupti), sister and 2 brothers.      12th grade.                               Review of patient's allergies indicates:   Allergen Reactions    No known drug allergies        Current Outpatient Medications:     albuterol 90 mcg/actuation inhaler, Inhale 2 puffs into the lungs every 4 (four) hours as needed for Wheezing., Disp: 1 Inhaler, Rfl: 3    azelastine (ASTELIN) 137 mcg (0.1 %) nasal spray, 1 spray (137 mcg total) by Nasal route 2 (two) times daily., Disp: 30 mL, Rfl: 0    butalbital-acetaminophen-caffeine -40 mg (FIORICET, ESGIC) -40 mg per tablet, 1-2 po prn headache onset; may be repeated x 1 in 4-6 hours, Disp: 20 tablet, Rfl: 4    ESTARYLLA 0.25-35 mg-mcg per tablet, TAKE 1 TABLET BY MOUTH EVERY DAY, Disp: 84 tablet, Rfl: 3    inhalation spacing device, Use as directed for inhalation., Disp: 1 Device, Rfl: 0    ipratropium (ATROVENT) 42 mcg (0.06 %) nasal spray, 2 sprays by Nasal route 3 (three) times daily., Disp: 30 mL, Rfl: 0    mupirocin (BACTROBAN) 2 % ointment, Apply topically 3 (three) times daily., Disp: 1 Tube,  Rfl: 0    omeprazole (PRILOSEC) 40 MG capsule, Take 1 capsule (40 mg total) by mouth every morning., Disp: 90 capsule, Rfl: 2    ondansetron (ZOFRAN) 4 MG tablet, Take 1 tablet (4 mg total) by mouth every 6 (six) hours as needed for Nausea., Disp: 15 tablet, Rfl: 0    topiramate (TOPAMAX) 25 MG tablet, Take 1 tablet (25 mg total) by mouth every evening., Disp: 90 tablet, Rfl: 1    triamcinolone acetonide 0.1% (KENALOG) 0.1 % ointment, Apply topically 2 (two) times daily., Disp: 30 g, Rfl: 1      Objective:      Vitals:    03/28/23 1002   BP: 130/81   Pulse: 73   Resp: 17   Temp: 97.5 °F (36.4 °C)     Body mass index is 43.69 kg/m².      Physical Exam   Constitutional:   She appears well-developed and well-nourished. She is well groomed  He appears well-developed and well-nourished. He is well groomed  HENT:    Head: Atraumatic, oral and nasal mucosa intact  Eyes: Conjunctivae and EOM are normal. Pupils are equal, round, and reactive to light OU  Neck: Neck supple. No thyromegaly present  Cardiovascular: Normal rate and normal heart sounds  No murmur heard  Pulmonary/Chest: Effort normal and breath sounds normal  Musculoskeletal: Normal range of motion. No joint stiffness. No vertebral point tenderness  Skin: Skin is warm and dry  Psychiatric: Normal mood and affect     Neuro exam:    Neurological Exam:  MENTAL STATUS  Level of consciousness: alert  Orientation: oriented to person, place, and time  Attention normal. Concentration normal.  Speech: normal    CRANIAL NERVES  CN II: Visual fields full to confrontation  CN III, IV, VI: PERRL, EOMI  CN V: Facial sensation intact  CN VII: Facial expression symmetric and full  CN VIII: Hearing intact to finger rub  CN IX, X: Symmetric palate elevation. Phonation normal  CN XI: Shoulder shrug and head turn intact bilaterally  CN XII: Tongue midline with normal movements, no atrophy    MOTOR EXAM  Muscle bulk: normal  Muscle tone: normal  Pronator drift: absent    Strength -  Upper Extremities   Arm abduction Elbow flexion Elbow extension Wrist flexion Wrist extension Finger abduction   Right 5/5 5/5 5/5 5/5 5/5 5/5   Left 5/5 5/5 5/5 5/5 5/5 5/5     Strength - Lower Extremities   Hip flexion Knee flexion Knee extension Dorsiflexion Plantarflexion   Right 5/5 5/5 5/5 5/5 5/5   Left 5/5 5/5 5/5 5/5 5/5       REFLEXES   Biceps Triceps Brachioradialis Patellar Achilles   Right +2 +2 +2 +2 +2   Left +2 +2 +2 +2 +2     Toes down bilaterally    SENSORY EXAM  Light touch: intact in all 4 extremities  Vibration: intact in all 4 extremities  Temperature: intact in all 4 extremities  Pinprick: intact in all 4 extremities    COORDINATION  Finger to nose: normal  Tremor: none    Gait steady with normal arm swing, base, and turning  Romberg negative          Assessment and Plan   Chronic migraine without aura, intractable, without status migrainosus fluctuating with high frequently episodic migraine without aura.   BOTOX  The patient has chronic migraines ( G43.719) and suffers from headaches more than 3 months, more than 15 days of headache days per month lasting more than 4 hours with at least 8 attacks that meet criteria for migraine. She has tried multiple medications including but not limited to TOPAMAX, ELAVIL, WELLBUTRIN. The patient is an ideal candidate for Botox. After treatment, I expect 50%  improvement in the patient's symptoms. A reduction of at least 7 days per month and the number of cumulative hours suffering with headaches as well as at least 100 total hours affected with migraine per month. After obtaining informed consent and under aseptic technique, a total of 155 units of botulinum toxin type A will be injected in the following muscles: Procerus 5 units,  5 units bilaterally, frontalis 20 units, temporalis 20 units bilaterally, occipitalis 15 units, upper cervical paraspinals 10 units bilaterally and trapezius 15 units bilaterally. The patient will receive a total of  155 units in 31 sites. Frequency of treatment is every 3 months unless no response to the treatments, at which time we will discontinue the injections.      Plan  -- CBC/CMP and TSH  -- MRV Brain LOOKING FOR VENOUS SINUS STENOSIS  -- Referral for Sleep study  -- low carb diet  -- Botox treatments planned for prevention  -- Cambia and Trudhesa for acute events  -- Will consider Qulipta (Atogepant) or mAbs if needed  -- RTC in May for botox treatment    I have discussed the side effects of the medications prescribed and the patient acknowledges understanding    I have personally seen and evaluated this patient. I have confirmed key portions of the history and examination. I concurred with the residents findings and documentation    I spent 60 minutes on the day of this encounter preparing, treating, and managing this patient with intractable migraine headaches.    03/28/2023  Ronald La MD, [facility]  Neurology Resident, PGY-2  Department of Neurology  [facility]      Carissa Chavez M.D  Medical Director, Headache and Facial Pain  [facility]

## 2023-03-31 ENCOUNTER — PATIENT MESSAGE (OUTPATIENT)
Dept: OPTOMETRY | Facility: CLINIC | Age: 18
End: 2023-03-31
Payer: COMMERCIAL

## 2023-04-04 ENCOUNTER — PATIENT MESSAGE (OUTPATIENT)
Dept: NEUROLOGY | Facility: CLINIC | Age: 18
End: 2023-04-04
Payer: COMMERCIAL

## 2023-04-08 ENCOUNTER — OFFICE VISIT (OUTPATIENT)
Dept: URGENT CARE | Facility: CLINIC | Age: 18
End: 2023-04-08
Payer: COMMERCIAL

## 2023-04-08 VITALS
TEMPERATURE: 99 F | WEIGHT: 293 LBS | BODY MASS INDEX: 43.4 KG/M2 | HEART RATE: 108 BPM | RESPIRATION RATE: 16 BRPM | OXYGEN SATURATION: 96 % | HEIGHT: 69 IN | DIASTOLIC BLOOD PRESSURE: 79 MMHG | SYSTOLIC BLOOD PRESSURE: 113 MMHG

## 2023-04-08 DIAGNOSIS — M54.50 ACUTE MIDLINE LOW BACK PAIN WITHOUT SCIATICA: ICD-10-CM

## 2023-04-08 DIAGNOSIS — R10.84 GENERALIZED ABDOMINAL PAIN: Primary | ICD-10-CM

## 2023-04-08 DIAGNOSIS — R11.0 NAUSEA IN ADULT: ICD-10-CM

## 2023-04-08 LAB
B-HCG UR QL: NEGATIVE
BILIRUB UR QL STRIP: POSITIVE
CTP QC/QA: YES
GLUCOSE UR QL STRIP: NEGATIVE
KETONES UR QL STRIP: NEGATIVE
LEUKOCYTE ESTERASE UR QL STRIP: NEGATIVE
PH, POC UA: 6 (ref 5–8)
POC BLOOD, URINE: NEGATIVE
POC NITRATES, URINE: NEGATIVE
PROT UR QL STRIP: POSITIVE
SP GR UR STRIP: 1.02 (ref 1–1.03)
UROBILINOGEN UR STRIP-ACNC: ABNORMAL (ref 0.1–1.1)

## 2023-04-08 PROCEDURE — 99214 OFFICE O/P EST MOD 30 MIN: CPT | Mod: 25,S$GLB,, | Performed by: FAMILY MEDICINE

## 2023-04-08 PROCEDURE — 96372 PR INJECTION,THERAP/PROPH/DIAG2ST, IM OR SUBCUT: ICD-10-PCS | Mod: S$GLB,,, | Performed by: FAMILY MEDICINE

## 2023-04-08 PROCEDURE — 96372 THER/PROPH/DIAG INJ SC/IM: CPT | Mod: S$GLB,,, | Performed by: FAMILY MEDICINE

## 2023-04-08 PROCEDURE — S0119 ONDANSETRON 4 MG: HCPCS | Mod: S$GLB,,, | Performed by: FAMILY MEDICINE

## 2023-04-08 PROCEDURE — 74018 RADEX ABDOMEN 1 VIEW: CPT | Mod: S$GLB,,, | Performed by: RADIOLOGY

## 2023-04-08 PROCEDURE — 81025 URINE PREGNANCY TEST: CPT | Mod: S$GLB,,, | Performed by: FAMILY MEDICINE

## 2023-04-08 PROCEDURE — 81003 URINALYSIS AUTO W/O SCOPE: CPT | Mod: QW,S$GLB,, | Performed by: FAMILY MEDICINE

## 2023-04-08 PROCEDURE — S0119 PR ONDANSETRON, ORAL, 4MG: ICD-10-PCS | Mod: S$GLB,,, | Performed by: FAMILY MEDICINE

## 2023-04-08 PROCEDURE — 87086 URINE CULTURE/COLONY COUNT: CPT | Performed by: FAMILY MEDICINE

## 2023-04-08 PROCEDURE — 81025 POCT URINE PREGNANCY: ICD-10-PCS | Mod: S$GLB,,, | Performed by: FAMILY MEDICINE

## 2023-04-08 PROCEDURE — 74018 XR KUB: ICD-10-PCS | Mod: S$GLB,,, | Performed by: RADIOLOGY

## 2023-04-08 PROCEDURE — 81003 POCT URINALYSIS, DIPSTICK, AUTOMATED, W/O SCOPE: ICD-10-PCS | Mod: QW,S$GLB,, | Performed by: FAMILY MEDICINE

## 2023-04-08 PROCEDURE — 99214 PR OFFICE/OUTPT VISIT, EST, LEVL IV, 30-39 MIN: ICD-10-PCS | Mod: 25,S$GLB,, | Performed by: FAMILY MEDICINE

## 2023-04-08 RX ORDER — ONDANSETRON 4 MG/1
4 TABLET, ORALLY DISINTEGRATING ORAL EVERY 8 HOURS PRN
Qty: 30 TABLET | Refills: 0 | Status: SHIPPED | OUTPATIENT
Start: 2023-04-08 | End: 2023-11-13 | Stop reason: SDUPTHER

## 2023-04-08 RX ORDER — KETOROLAC TROMETHAMINE 30 MG/ML
30 INJECTION, SOLUTION INTRAMUSCULAR; INTRAVENOUS
Status: COMPLETED | OUTPATIENT
Start: 2023-04-08 | End: 2023-04-08

## 2023-04-08 RX ORDER — ONDANSETRON 4 MG/1
4 TABLET, ORALLY DISINTEGRATING ORAL
Status: COMPLETED | OUTPATIENT
Start: 2023-04-08 | End: 2023-04-08

## 2023-04-08 RX ADMIN — KETOROLAC TROMETHAMINE 30 MG: 30 INJECTION, SOLUTION INTRAMUSCULAR; INTRAVENOUS at 11:04

## 2023-04-08 RX ADMIN — ONDANSETRON 4 MG: 4 TABLET, ORALLY DISINTEGRATING ORAL at 11:04

## 2023-04-08 NOTE — PROGRESS NOTES
"Subjective:      Patient ID: Kalyn Kirby is a 17 y.o. female.    Vitals:  height is 5' 9" (1.753 m) and weight is 133.8 kg (295 lb). Her oral temperature is 99 °F (37.2 °C). Her blood pressure is 113/79 and her pulse is 108. Her respiration is 16 and oxygen saturation is 96%.     Chief Complaint: Abdominal Pain    Abdominal Pain  This is a new problem. The current episode started yesterday. The onset quality is sudden. The problem occurs constantly. The problem has been unchanged. The pain is located in the generalized abdominal region. The pain is at a severity of 7/10. The pain is moderate. The quality of the pain is aching, a sensation of fullness and cramping. The abdominal pain does not radiate. Associated symptoms include headaches and nausea. Pertinent negatives include no anorexia, arthralgias, belching, constipation, diarrhea, dysuria, fever, flatus, frequency, hematochezia, hematuria, melena, myalgias, vomiting or weight loss. Nothing aggravates the pain. The pain is relieved by Nothing. She has tried nothing for the symptoms. The treatment provided moderate relief. There is no history of abdominal surgery, colon cancer, Crohn's disease, gallstones, GERD, irritable bowel syndrome, pancreatitis, PUD or ulcerative colitis. Patient's medical history does not include kidney stones and UTI.     Constitution: Negative for fever.   Gastrointestinal:  Positive for abdominal pain and nausea. Negative for history of abdominal surgery, vomiting, constipation, diarrhea and bright red blood in stool.   Genitourinary:  Negative for dysuria, frequency and hematuria.   Musculoskeletal:  Negative for joint pain and muscle ache.   Neurological:  Positive for headaches.    Objective:     Vitals:    04/08/23 1116   BP: 113/79   Pulse: 108   Resp: 16   Temp: 99 °F (37.2 °C)   TempSrc: Oral   SpO2: 96%   Weight: 133.8 kg (295 lb)   Height: 5' 9" (1.753 m)      Physical Exam   Constitutional: She is oriented to person, " place, and time.  Non-toxic appearance. She does not appear ill. No distress. obesity  HENT:   Head: Atraumatic.   Eyes: Conjunctivae are normal.   Cardiovascular: Normal rate, regular rhythm, normal heart sounds and normal pulses.   Pulmonary/Chest: Effort normal and breath sounds normal.   Abdominal: Bowel sounds are normal. She exhibits no mass. Soft. There is abdominal tenderness (generalized). There is no rebound, no guarding and no right CVA tenderness. No hernia.   Musculoskeletal:         General: No swelling, tenderness or deformity.      Comments: No deformity of spine   Neurological: She is alert and oriented to person, place, and time.   Skin: Skin is not diaphoretic.   Psychiatric: Judgment and thought content normal.     US Abdomen 12/6/22  Impression:     Mild hepatomegaly.  No acute findings.    CT Renal Stone 12/3/2018  IMPRESSION:      1. Upper limit of normal pericecal lymph nodes, nonspecific, correlation advised in this patient with left lower quadrant pain.  2. No findings to suggest obstructive uropathy.  The urinary bladder is decompressed, likely accounting for mild wall thickening, however correlation with urinalysis as warranted.  3. Hepatomegaly, correlation with LFTs recommended.  4. Additional findings above.    Results for orders placed or performed in visit on 04/08/23   POCT urine pregnancy   Result Value Ref Range    POC Preg Test, Ur Negative Negative     Acceptable Yes    POCT Urinalysis, Dipstick, Automated, W/O Scope   Result Value Ref Range    POC Blood, Urine Negative Negative    POC Bilirubin, Urine Positive (A) Negative    POC Urobilinogen, Urine abnormal 0.1 - 1.1    POC Ketones, Urine Negative Negative    POC Protein, Urine Positive (A) Negative    POC Nitrates, Urine Negative Negative    POC Glucose, Urine Negative Negative    pH, UA 6.0 5 - 8    POC Specific Gravity, Urine 1.025 1.003 - 1.029    POC Leukocytes, Urine Negative Negative      XR KUB    Result  Date: 4/8/2023  EXAMINATION: XR KUB CLINICAL HISTORY: Unspecified abdominal pain TECHNIQUE: Single AP supine view of the abdomen (KUB) was performed COMPARISON: Prior dated 06/20/2010 FINDINGS: There are no dilated bowel loops to suggest obstruction. Sensitivity for free air is limited by lack of upright view and non inclusion of the diaphragm.  No abnormal calcifications or masses are seen.  No osseous abnormalities are identified.     No dilated bowel loops to suggest obstruction. Electronically signed by: Abbie Forrester MD Date:    04/08/2023 Time:    11:51    Assessment:     1. Generalized abdominal pain    2. Acute midline low back pain without sciatica    3. Nausea in adult        Plan:       Generalized abdominal pain  16 yo non pregnant female with generalized abdominal pain and unrelated back pain. Headache and subjective fever relieved with OTC antipyretics. Nausea but no vomiting. No bowel habit changes. Non surgical abdomen on exam. No signs of peritonitis. Vitals stable.   Differentials are many: constipation, viral syndrome, muscular etiology. Alarm symptoms requiring ED presentation discussed.   -     POCT urine pregnancy  -     POCT Urinalysis, Dipstick, Automated, W/O Scope  -     Urine culture  -     XR KUB; Future; Expected date: 04/08/2023    2. Acute midline low back pain without sciatica  -     ketorolac injection 30 mg    3. Nausea in adult  -     ondansetron disintegrating tablet 4 mg  -     ondansetron (ZOFRAN-ODT) 4 MG TbDL; Take 1 tablet (4 mg total) by mouth every 8 (eight) hours as needed (nausea/ vomiting).  Dispense: 30 tablet; Refill: 0      Normal liver function 10/14/22. Patient will follow up with PCP; report to ED if any alarm symptoms.  Patient Education     Substitute the words Emergency  Department with Urgent Care  Abdominal Pain, Adult ED   General Information   You came to the Emergency Department (ED) for abdominal or belly pain. The doctor feels that the risk of a  serious cause for your belly pain is low.  Many things can cause belly pain. Some are serious things like bleeding or an infection. Less serious things, like an upset stomach, can also cause belly pain.  The doctors may not be able to find all serious causes of belly pain the first time they see you. It is important that you follow up with your doctor. You may be waiting on some test results. The staff will notify you if there are concerning results.  What care is needed at home?   Call your regular doctor to let them know you were in the ED. Make a follow-up appointment if you were told to.  Keep a diary about your pain to help your doctor learn more about the cause. Write down the foods you eat to see if they may be the cause of your pain. Also write down what you were doing before and during the pain.  Eat small meals more often. Eat more fiber if hard stools are a problem.  Avoid foods or drinks that make your pain worse. Some people are bothered by:  Drinks that are fizzy or have caffeine.  Fried, greasy, or fatty foods.  Orange juice.  Milk or cheese can bother some peoples stomach as well.  When you have pain, you can:  Try to have a bowel movement.  Lie down and rest.  Avoid solid foods for a few hours. If you are hungry, try liquids like broth or water. When you feel better, try mild foods like rice, crackers, bananas, applesauce, or toast.  Dont take over-the-counter medicines, such as antacids or laxatives, unless they are ordered.  Check with the doctor before you take any herbal medicines or supplements.  When do I need to get emergency help?   Call for an ambulance right away if:   You have sudden severe belly pain, or the pain is constant.  You have trouble breathing or chest pain along with your belly pain.  You start throwing up blood or pass a lot of blood in your stool.  Your belly becomes very hard or swollen.  You get a fever 102.2°F (39°C) or higher or shaking chills.  Return to the ED if:    You have signs of severe fluid loss, such as:  No urine for more than 8 hours.  You feel very light-headed or like you are going to pass out.  You feel weak, like you are going to fall.  Your pain gets worse, comes more often or moves to one area of the belly  You have an upset stomach or throwing up that isnt getting better and are having trouble keeping down food and drink.  Your stools are black or tar colored.  When do I need to call the doctor?   If the pain is not gone or getting better in 1 to 2 days.  You have a fever of 100.4°F (38°C) or higher, chills.  You develop early signs of fluid loss, such as:  Your urine is very dark colored.  Your mouth is dry.  You have muscle cramps.  You have a lack of energy.  You feel light-headed when you get up.  You have pain with passing urine or have blood in your urine.  Your stools have a small amount (less than 1 teaspoon or 5 mL) of blood in them.  You feel that something is not right in your belly.  You have new or worsening symptoms.  Last Reviewed Date   2021-05-07  Consumer Information Use and Disclaimer   This information is not specific medical advice and does not replace information you receive from your health care provider. This is only a brief summary of general information. It does NOT include all information about conditions, illnesses, injuries, tests, procedures, treatments, therapies, discharge instructions or life-style choices that may apply to you. You must talk with your health care provider for complete information about your health and treatment options. This information should not be used to decide whether or not to accept your health care providers advice, instructions or recommendations. Only your health care provider has the knowledge and training to provide advice that is right for you.  Copyright   Copyright © 2021 [employer] and its affiliates and/or licensors. All rights reserved.

## 2023-04-09 LAB
BACTERIA UR CULT: NORMAL
BACTERIA UR CULT: NORMAL

## 2023-04-11 ENCOUNTER — TELEPHONE (OUTPATIENT)
Dept: URGENT CARE | Facility: CLINIC | Age: 18
End: 2023-04-11
Payer: COMMERCIAL

## 2023-04-25 ENCOUNTER — TELEPHONE (OUTPATIENT)
Dept: NEUROLOGY | Facility: CLINIC | Age: 18
End: 2023-04-25
Payer: COMMERCIAL

## 2023-05-10 ENCOUNTER — TELEPHONE (OUTPATIENT)
Dept: PRIMARY CARE CLINIC | Facility: CLINIC | Age: 18
End: 2023-05-10
Payer: COMMERCIAL

## 2023-05-10 NOTE — TELEPHONE ENCOUNTER
Left message to let pt know we need to reschedule appt for tomorrow due to Edie not being in the office. Sending mychart message as well.

## 2023-05-18 ENCOUNTER — OFFICE VISIT (OUTPATIENT)
Dept: PRIMARY CARE CLINIC | Facility: CLINIC | Age: 18
End: 2023-05-18
Payer: COMMERCIAL

## 2023-05-18 VITALS
BODY MASS INDEX: 43.66 KG/M2 | OXYGEN SATURATION: 99 % | HEART RATE: 107 BPM | DIASTOLIC BLOOD PRESSURE: 88 MMHG | SYSTOLIC BLOOD PRESSURE: 142 MMHG | WEIGHT: 293 LBS

## 2023-05-18 DIAGNOSIS — L30.9 DERMATITIS: Primary | ICD-10-CM

## 2023-05-18 DIAGNOSIS — R06.02 SOB (SHORTNESS OF BREATH): ICD-10-CM

## 2023-05-18 DIAGNOSIS — R06.2 WHEEZE: ICD-10-CM

## 2023-05-18 DIAGNOSIS — R05.1 ACUTE COUGH: ICD-10-CM

## 2023-05-18 PROCEDURE — 3008F BODY MASS INDEX DOCD: CPT | Mod: CPTII,S$GLB,, | Performed by: NURSE PRACTITIONER

## 2023-05-18 PROCEDURE — 99999 PR PBB SHADOW E&M-EST. PATIENT-LVL IV: CPT | Mod: PBBFAC,,, | Performed by: NURSE PRACTITIONER

## 2023-05-18 PROCEDURE — 3008F PR BODY MASS INDEX (BMI) DOCUMENTED: ICD-10-PCS | Mod: CPTII,S$GLB,, | Performed by: NURSE PRACTITIONER

## 2023-05-18 PROCEDURE — 1159F PR MEDICATION LIST DOCUMENTED IN MEDICAL RECORD: ICD-10-PCS | Mod: CPTII,S$GLB,, | Performed by: NURSE PRACTITIONER

## 2023-05-18 PROCEDURE — 3077F PR MOST RECENT SYSTOLIC BLOOD PRESSURE >= 140 MM HG: ICD-10-PCS | Mod: CPTII,S$GLB,, | Performed by: NURSE PRACTITIONER

## 2023-05-18 PROCEDURE — 99214 OFFICE O/P EST MOD 30 MIN: CPT | Mod: S$GLB,,, | Performed by: NURSE PRACTITIONER

## 2023-05-18 PROCEDURE — 99214 PR OFFICE/OUTPT VISIT, EST, LEVL IV, 30-39 MIN: ICD-10-PCS | Mod: S$GLB,,, | Performed by: NURSE PRACTITIONER

## 2023-05-18 PROCEDURE — 1160F PR REVIEW ALL MEDS BY PRESCRIBER/CLIN PHARMACIST DOCUMENTED: ICD-10-PCS | Mod: CPTII,S$GLB,, | Performed by: NURSE PRACTITIONER

## 2023-05-18 PROCEDURE — 1160F RVW MEDS BY RX/DR IN RCRD: CPT | Mod: CPTII,S$GLB,, | Performed by: NURSE PRACTITIONER

## 2023-05-18 PROCEDURE — 3077F SYST BP >= 140 MM HG: CPT | Mod: CPTII,S$GLB,, | Performed by: NURSE PRACTITIONER

## 2023-05-18 PROCEDURE — 3079F PR MOST RECENT DIASTOLIC BLOOD PRESSURE 80-89 MM HG: ICD-10-PCS | Mod: CPTII,S$GLB,, | Performed by: NURSE PRACTITIONER

## 2023-05-18 PROCEDURE — 1159F MED LIST DOCD IN RCRD: CPT | Mod: CPTII,S$GLB,, | Performed by: NURSE PRACTITIONER

## 2023-05-18 PROCEDURE — 3079F DIAST BP 80-89 MM HG: CPT | Mod: CPTII,S$GLB,, | Performed by: NURSE PRACTITIONER

## 2023-05-18 PROCEDURE — 99999 PR PBB SHADOW E&M-EST. PATIENT-LVL IV: ICD-10-PCS | Mod: PBBFAC,,, | Performed by: NURSE PRACTITIONER

## 2023-05-18 RX ORDER — METHYLPREDNISOLONE 4 MG/1
TABLET ORAL
Qty: 21 EACH | Refills: 0 | Status: SHIPPED | OUTPATIENT
Start: 2023-05-18 | End: 2023-06-08

## 2023-05-18 RX ORDER — BROMPHENIRAMINE MALEATE, PSEUDOEPHEDRINE HYDROCHLORIDE, AND DEXTROMETHORPHAN HYDROBROMIDE 2; 30; 10 MG/5ML; MG/5ML; MG/5ML
5 SYRUP ORAL
Qty: 140 ML | Refills: 0 | Status: SHIPPED | OUTPATIENT
Start: 2023-05-18 | End: 2023-05-28

## 2023-05-18 RX ORDER — CLOTRIMAZOLE AND BETAMETHASONE DIPROPIONATE 10; .5 MG/ML; MG/ML
LOTION TOPICAL 2 TIMES DAILY
Qty: 30 ML | Refills: 0 | Status: SHIPPED | OUTPATIENT
Start: 2023-05-18

## 2023-05-18 NOTE — PROGRESS NOTES
[facility]    Chief Complaint      Chief Complaint   Patient presents with    Cough    Rash     History of Present Illness      Kalyn Kirby is a 17 y.o. female patient of Dr. Diaz who presents today for rash on right breast that she has noticed has gotten larger over the past 2 weeks.  No pain or itching to breast, drainage or blistering.     She also is complaining of a bad dry cough that she has had over the past 3 weeks. She reports no cold, or sinus problems, not cough up phlegm, but has felt SOB. No c/o CP,       No f/h breast cancer noted  + thyroid, cervical, esphageal      Health Maintenance   Topic Date Due    Chlamydia Screening  08/06/2020    TETANUS VACCINE  08/27/2024    DTaP/Tdap/Td Vaccines (7 - Td or Tdap) 08/27/2024    Hepatitis C Screening  Completed    Hib Vaccines  Completed    Hepatitis B Vaccines  Completed    IPV Vaccines  Completed    Lipid Panel  Completed    Hepatitis A Vaccines  Completed    MMR Vaccines  Completed    Varicella Vaccines  Completed    Meningococcal Vaccine  Completed    HPV Vaccines  Completed       Past Medical History:   Diagnosis Date    Allergy     Asthma     Cough     Eczema     Headache(784.0)     Obesity     Rhinitis     seasonal    Snoring        Past Surgical History:   Procedure Laterality Date    ADENOIDECTOMY      TONSILLECTOMY      TYMPANOSTOMY TUBE PLACEMENT         family history includes Alcohol abuse in her maternal aunt, maternal aunt, and paternal uncle; Allergies in her brother, father, and mother; Arthritis in her mother; Birth defects in her mother; Blindness in her cousin; Cancer in her maternal grandmother, mother, and paternal grandfather; Depression in her brother, maternal aunt, mother, and paternal uncle; Drug abuse in her maternal aunt and paternal uncle; Hypertension in her father, maternal grandmother, mother, and paternal grandmother; Learning disabilities in her maternal aunt and paternal uncle; Melanoma  in her father; Miscarriages / Stillbirths in her mother; Rhinitis in her brother, father, and mother; Stroke in her mother; Tourette syndrome in her father; Urticaria in her mother; Vision loss in her sister.    Social History     Tobacco Use    Smoking status: Never    Smokeless tobacco: Never    Tobacco comments:     MOLINA smokes   Substance Use Topics    Alcohol use: Never    Drug use: Never       Review of Systems   Constitutional:  Negative for fever.   HENT:  Negative for congestion.    Respiratory:  Positive for cough. Negative for sputum production and shortness of breath.    Cardiovascular:  Negative for chest pain and palpitations.   Gastrointestinal:  Negative for nausea and vomiting.   Musculoskeletal:  Negative for myalgias.   Skin:  Positive for rash. Negative for itching.   Neurological:  Negative for dizziness and headaches.   Psychiatric/Behavioral:  Negative for depression. The patient is not nervous/anxious.       Outpatient Encounter Medications as of 5/18/2023   Medication Sig Dispense Refill    albuterol 90 mcg/actuation inhaler Inhale 2 puffs into the lungs every 4 (four) hours as needed for Wheezing. 1 Inhaler 3    butalbital-acetaminophen-caffeine -40 mg (FIORICET, ESGIC) -40 mg per tablet 1-2 po prn headache onset; may be repeated x 1 in 4-6 hours 20 tablet 4    ESTARYLLA 0.25-35 mg-mcg per tablet TAKE 1 TABLET BY MOUTH EVERY DAY 84 tablet 3    inhalation spacing device Use as directed for inhalation. 1 Device 0    ipratropium (ATROVENT) 42 mcg (0.06 %) nasal spray 2 sprays by Nasal route 3 (three) times daily. 30 mL 0    mupirocin (BACTROBAN) 2 % ointment Apply topically 3 (three) times daily. 1 Tube 0    omeprazole (PRILOSEC) 40 MG capsule Take 1 capsule (40 mg total) by mouth every morning. 90 capsule 2    ondansetron (ZOFRAN) 4 MG tablet Take 1 tablet (4 mg total) by mouth every 6 (six) hours as needed for Nausea. 15 tablet 0    ondansetron (ZOFRAN-ODT) 4 MG TbDL DISSOLVE 1  tablet (4 mg total) by mouth every 8 (eight) hours as needed (nausea/ vomiting). 30 tablet 0    triamcinolone acetonide 0.1% (KENALOG) 0.1 % ointment Apply topically 2 (two) times daily. 30 g 1    azelastine (ASTELIN) 137 mcg (0.1 %) nasal spray 1 spray (137 mcg total) by Nasal route 2 (two) times daily. 30 mL 0    brompheniramine-pseudoeph-DM (BROMFED DM) 2-30-10 mg/5 mL Syrp Take 5 mLs by mouth every 4 to 6 hours as needed (cough). 140 mL 0    clotrimazole-betamethasone (LOTRISONE) lotion Apply topically 2 (two) times daily. 30 mL 0    diclofenac potassium (CAMBIA) 50 mg PwPk Take 1 Package by mouth daily as needed. 9 each 5    dihydroergotamine (TRUDHESA) 0.725 mg/pump act. (4 mg/mL) Spry 0.725 mg by Nasal route daily as needed (migraine). (Patient not taking: Reported on 5/18/2023) 8 mL 11    methylPREDNISolone (MEDROL DOSEPACK) 4 mg tablet Take 6 tablets by mouth on day 1, then decrease by 1 tablet a day for a total of 6 days as directed on package. 21 each 0     Facility-Administered Encounter Medications as of 5/18/2023   Medication Dose Route Frequency Provider Last Rate Last Admin    onabotulinumtoxina injection 200 Units  200 Units Intramuscular Q90 Days Ashley Chavez MD            Review of patient's allergies indicates:   Allergen Reactions    No known drug allergies        Physical Exam      Vital Signs  Pulse: 107  SpO2: 99 %  BP: (!) 142/88  BP Location: Right arm  Patient Position: Sitting  Height and Weight  Weight: 134.1 kg (295 lb 10.2 oz)    Physical Exam  Vitals and nursing note reviewed.   Constitutional:       General: She is not in acute distress.     Appearance: Normal appearance. She is not ill-appearing.   HENT:      Head: Normocephalic and atraumatic.   Cardiovascular:      Rate and Rhythm: Normal rate and regular rhythm.      Heart sounds: Normal heart sounds.   Pulmonary:      Effort: Pulmonary effort is normal. No respiratory distress.      Breath sounds: Wheezing present. No  rhonchi or rales.   Musculoskeletal:         General: Normal range of motion.   Skin:     General: Skin is warm and dry.      Findings: Rash present.      Comments: Right upper breast/chest with small round flat pink rash, no blistering or drainage, skin dry   Neurological:      Mental Status: She is alert.          Laboratory:  CBC:  Lab Results   Component Value Date    WBC 9.13 10/14/2022    RBC 4.46 10/14/2022    HGB 12.1 10/14/2022    HCT 38.3 10/14/2022     10/14/2022    MCV 86 10/14/2022    MCH 27.1 10/14/2022    MCHC 31.6 (L) 10/14/2022    MCHC 33.7 12/04/2018    MCHC 34.1 03/08/2018     CMP:  Lab Results   Component Value Date    GLU 72 10/14/2022    CALCIUM 9.4 10/14/2022    ALBUMIN 3.5 10/14/2022    PROT 7.5 10/14/2022     10/14/2022    K 3.8 10/14/2022    CO2 23 10/14/2022     10/14/2022    BUN 12 10/14/2022    ALKPHOS 46 (L) 10/14/2022    ALT 13 10/14/2022    AST 19 10/14/2022    BILITOT 0.4 10/14/2022    BILITOT 0.4 12/04/2018    BILITOT 0.6 03/08/2018     URINALYSIS:  Lab Results   Component Value Date    COLORU Yellow 12/02/2022    SPECGRAV 1.025 12/02/2022    PHUR 6.0 04/08/2023    PROTEINUA Trace (A) 12/02/2022    BACTERIA Rare 12/03/2018    NITRITE Negative 12/02/2022    LEUKOCYTESUR Negative 12/02/2022    UROBILINOGEN Negative 12/04/2018    HYALINECASTS 0 08/09/2016      LIPIDS:  Lab Results   Component Value Date    TSH 0.946 03/08/2018    TSH 2.099 08/27/2014    HDL 40 10/14/2022    CHOL 195 10/14/2022    CHOL 176 03/08/2018    CHOL 168 08/09/2016    TRIG 129 10/14/2022    LDLCALC 129.2 10/14/2022    CHOLHDL 20.5 10/14/2022    NONHDLCHOL 155 10/14/2022    TOTALCHOLEST 4.9 10/14/2022     TSH:  Lab Results   Component Value Date    TSH 0.946 03/08/2018    TSH 2.099 08/27/2014     A1C:  Lab Results   Component Value Date    HGBA1C 5.1 10/14/2022    HGBA1C 5.0 03/08/2018    HGBA1C 5.2 08/27/2014         Assessment/Plan     Klayn Kirby is a 17 y.o.female  with:    Dermatitis  -     clotrimazole-betamethasone (LOTRISONE) lotion; Apply topically 2 (two) times daily.  Dispense: 30 mL; Refill: 0    Acute cough  Wheeze  SOB (shortness of breath)  -     brompheniramine-pseudoeph-DM (BROMFED DM) 2-30-10 mg/5 mL Syrp; Take 5 mLs by mouth every 4 to 6 hours as needed (cough).  Dispense: 140 mL; Refill: 0  -     methylPREDNISolone (MEDROL DOSEPACK) 4 mg tablet; Take 6 tablets by mouth on day 1, then decrease by 1 tablet a day for a total of 6 days as directed on package.  Dispense: 21 each; Refill: 0        Stay hydrated with water, warm tea w/ honey  Gargle with warm salt water as needed  Take meds as prescribed    Health Maintenance Due   Topic Date Due    Pneumococcal Vaccines (Age 0-64) (2 - PCV) 11/20/2019    Chlamydia Screening  08/06/2020    COVID-19 Vaccine (4 - Booster for Pfizer series) 10/07/2022        I spent 35 minutes on the day of this encounter for preparing for, evaluating, treating, and managing this patient.      -Continue current medications and maintain follow up with specialists.  Return to clinic as needed for any concerns   No follow-ups on file.       DIMA Cortés  [facility]

## 2023-05-30 ENCOUNTER — OFFICE VISIT (OUTPATIENT)
Dept: PRIMARY CARE CLINIC | Facility: CLINIC | Age: 18
End: 2023-05-30
Payer: COMMERCIAL

## 2023-05-30 VITALS
HEIGHT: 69 IN | DIASTOLIC BLOOD PRESSURE: 76 MMHG | OXYGEN SATURATION: 98 % | SYSTOLIC BLOOD PRESSURE: 122 MMHG | BODY MASS INDEX: 43.4 KG/M2 | HEART RATE: 101 BPM | WEIGHT: 293 LBS

## 2023-05-30 DIAGNOSIS — K21.9 GASTROESOPHAGEAL REFLUX DISEASE, UNSPECIFIED WHETHER ESOPHAGITIS PRESENT: ICD-10-CM

## 2023-05-30 DIAGNOSIS — J30.9 ALLERGIC RHINITIS, UNSPECIFIED SEASONALITY, UNSPECIFIED TRIGGER: Primary | ICD-10-CM

## 2023-05-30 DIAGNOSIS — E66.01 CLASS 3 SEVERE OBESITY IN ADULT, UNSPECIFIED BMI, UNSPECIFIED OBESITY TYPE, UNSPECIFIED WHETHER SERIOUS COMORBIDITY PRESENT: ICD-10-CM

## 2023-05-30 PROCEDURE — 3078F DIAST BP <80 MM HG: CPT | Mod: CPTII,S$GLB,, | Performed by: STUDENT IN AN ORGANIZED HEALTH CARE EDUCATION/TRAINING PROGRAM

## 2023-05-30 PROCEDURE — 3078F PR MOST RECENT DIASTOLIC BLOOD PRESSURE < 80 MM HG: ICD-10-PCS | Mod: CPTII,S$GLB,, | Performed by: STUDENT IN AN ORGANIZED HEALTH CARE EDUCATION/TRAINING PROGRAM

## 2023-05-30 PROCEDURE — 1160F RVW MEDS BY RX/DR IN RCRD: CPT | Mod: CPTII,S$GLB,, | Performed by: STUDENT IN AN ORGANIZED HEALTH CARE EDUCATION/TRAINING PROGRAM

## 2023-05-30 PROCEDURE — 1160F PR REVIEW ALL MEDS BY PRESCRIBER/CLIN PHARMACIST DOCUMENTED: ICD-10-PCS | Mod: CPTII,S$GLB,, | Performed by: STUDENT IN AN ORGANIZED HEALTH CARE EDUCATION/TRAINING PROGRAM

## 2023-05-30 PROCEDURE — 99214 PR OFFICE/OUTPT VISIT, EST, LEVL IV, 30-39 MIN: ICD-10-PCS | Mod: S$GLB,,, | Performed by: STUDENT IN AN ORGANIZED HEALTH CARE EDUCATION/TRAINING PROGRAM

## 2023-05-30 PROCEDURE — 99214 OFFICE O/P EST MOD 30 MIN: CPT | Mod: S$GLB,,, | Performed by: STUDENT IN AN ORGANIZED HEALTH CARE EDUCATION/TRAINING PROGRAM

## 2023-05-30 PROCEDURE — 3008F BODY MASS INDEX DOCD: CPT | Mod: CPTII,S$GLB,, | Performed by: STUDENT IN AN ORGANIZED HEALTH CARE EDUCATION/TRAINING PROGRAM

## 2023-05-30 PROCEDURE — 1159F PR MEDICATION LIST DOCUMENTED IN MEDICAL RECORD: ICD-10-PCS | Mod: CPTII,S$GLB,, | Performed by: STUDENT IN AN ORGANIZED HEALTH CARE EDUCATION/TRAINING PROGRAM

## 2023-05-30 PROCEDURE — 3074F SYST BP LT 130 MM HG: CPT | Mod: CPTII,S$GLB,, | Performed by: STUDENT IN AN ORGANIZED HEALTH CARE EDUCATION/TRAINING PROGRAM

## 2023-05-30 PROCEDURE — 3074F PR MOST RECENT SYSTOLIC BLOOD PRESSURE < 130 MM HG: ICD-10-PCS | Mod: CPTII,S$GLB,, | Performed by: STUDENT IN AN ORGANIZED HEALTH CARE EDUCATION/TRAINING PROGRAM

## 2023-05-30 PROCEDURE — 99999 PR PBB SHADOW E&M-EST. PATIENT-LVL IV: ICD-10-PCS | Mod: PBBFAC,,, | Performed by: STUDENT IN AN ORGANIZED HEALTH CARE EDUCATION/TRAINING PROGRAM

## 2023-05-30 PROCEDURE — 99999 PR PBB SHADOW E&M-EST. PATIENT-LVL IV: CPT | Mod: PBBFAC,,, | Performed by: STUDENT IN AN ORGANIZED HEALTH CARE EDUCATION/TRAINING PROGRAM

## 2023-05-30 PROCEDURE — 3008F PR BODY MASS INDEX (BMI) DOCUMENTED: ICD-10-PCS | Mod: CPTII,S$GLB,, | Performed by: STUDENT IN AN ORGANIZED HEALTH CARE EDUCATION/TRAINING PROGRAM

## 2023-05-30 PROCEDURE — 1159F MED LIST DOCD IN RCRD: CPT | Mod: CPTII,S$GLB,, | Performed by: STUDENT IN AN ORGANIZED HEALTH CARE EDUCATION/TRAINING PROGRAM

## 2023-05-30 NOTE — PROGRESS NOTES
Subjective:       Patient ID: Kalyn Kirby is a 18 y.o. female.   Chief Complaint: Establish Care and Annual Exam        Cough and sinus congestion x 3-4 days. + cough. No fevers. Unknown if sick contacts. No cp, sob, difficulty breathing, no fevers.     GERD:Omeprazole daily    Obesity: BMI 44.31 in clinic today    Review of Systems   Constitutional:  Negative for fatigue and fever.   HENT:  Positive for sinus pressure/congestion. Negative for rhinorrhea, sneezing, sore throat and trouble swallowing.    Respiratory:  Positive for cough. Negative for shortness of breath.    Cardiovascular:  Negative for chest pain.   Gastrointestinal:  Negative for abdominal pain, diarrhea, nausea and vomiting.   Musculoskeletal:  Negative for back pain.   Neurological:  Negative for weakness.            Objective:        Physical Exam  HENT:      Head: Normocephalic and atraumatic.      Nose: Nose normal.      Mouth/Throat:      Mouth: Mucous membranes are moist.      Pharynx: Oropharynx is clear.   Eyes:      Extraocular Movements: Extraocular movements intact.      Conjunctiva/sclera: Conjunctivae normal.      Pupils: Pupils are equal, round, and reactive to light.   Cardiovascular:      Rate and Rhythm: Normal rate and regular rhythm.   Pulmonary:      Effort: Pulmonary effort is normal.      Breath sounds: Normal breath sounds.   Musculoskeletal:         General: No swelling. Normal range of motion.      Cervical back: Normal range of motion.      Right lower leg: No edema.      Left lower leg: No edema.   Skin:     General: Skin is warm.      Findings: No lesion or rash.   Neurological:      General: No focal deficit present.      Mental Status: She is alert and oriented to person, place, and time.      Motor: No weakness.   Psychiatric:         Mood and Affect: Mood normal.         Thought Content: Thought content normal.         Assessment:         Problem List Items Addressed This Visit          Endocrine    Class  3 severe obesity in adult, unspecified BMI, unspecified obesity type, unspecified whether serious comorbidity present       GI    Gastroesophageal reflux disease     Other Visit Diagnoses       Allergic rhinitis, unspecified seasonality, unspecified trigger    -  Primary              Plan:         1. Allergic rhinitis, unspecified seasonality, unspecified trigger  - discussed etiology of symptoms and treatment: recommend low dose intranasal steroids and Antihistamine, and nasal saline rinse. OTC cough suppressants and lozenges prn. Pt instructed to notify clinic if fever develops, or no improvement.    2. Class 3 severe obesity in adult, unspecified BMI, unspecified obesity type, unspecified whether serious comorbidity present  Continue working on improving diet and including exercise daily.    3. Gastroesophageal reflux disease, unspecified whether esophagitis present  Stable on medications, continue regimen    Bre Castillo MD

## 2023-06-18 PROBLEM — K21.9 GASTROESOPHAGEAL REFLUX DISEASE: Status: ACTIVE | Noted: 2023-06-18

## 2023-06-18 PROBLEM — E66.01 CLASS 3 SEVERE OBESITY IN ADULT, UNSPECIFIED BMI, UNSPECIFIED OBESITY TYPE, UNSPECIFIED WHETHER SERIOUS COMORBIDITY PRESENT: Status: ACTIVE | Noted: 2023-06-18

## 2023-06-18 PROBLEM — E66.813 CLASS 3 SEVERE OBESITY IN ADULT, UNSPECIFIED BMI, UNSPECIFIED OBESITY TYPE, UNSPECIFIED WHETHER SERIOUS COMORBIDITY PRESENT: Status: ACTIVE | Noted: 2023-06-18

## 2023-06-19 ENCOUNTER — PATIENT MESSAGE (OUTPATIENT)
Dept: OPTOMETRY | Facility: CLINIC | Age: 18
End: 2023-06-19
Payer: COMMERCIAL

## 2023-06-28 ENCOUNTER — PATIENT MESSAGE (OUTPATIENT)
Dept: PRIMARY CARE CLINIC | Facility: CLINIC | Age: 18
End: 2023-06-28
Payer: COMMERCIAL

## 2023-06-28 ENCOUNTER — PATIENT MESSAGE (OUTPATIENT)
Dept: ADMINISTRATIVE | Facility: OTHER | Age: 18
End: 2023-06-28
Payer: COMMERCIAL

## 2023-06-28 DIAGNOSIS — U07.1 COVID-19: Primary | ICD-10-CM

## 2023-06-28 NOTE — TELEPHONE ENCOUNTER
Please call and see if she is currently using Trudhesa (nasal spray) for migraines. If not, when was her last dose. If she is using it she can not take with Paxlovid

## 2023-06-28 NOTE — TELEPHONE ENCOUNTER
Try the following over the counter medications to help with your symptoms:   1. Antihistamine once daily (either Zyrtec, Allegra, Claritin or Xyzal)   2. Flonase nasal spray once daily (fluticasone is generic)   3. Mucinex twice daily (Guaifenesin is generic)   4. Vitamin C, Vitamin D, Zinc        She is also eligible for Paxlovid which is an antiviral that might help lessen duration of symptoms if she is interested.

## 2023-06-30 ENCOUNTER — OFFICE VISIT (OUTPATIENT)
Dept: OTOLARYNGOLOGY | Facility: CLINIC | Age: 18
End: 2023-06-30
Payer: COMMERCIAL

## 2023-06-30 VITALS — WEIGHT: 277.75 LBS | BODY MASS INDEX: 41.02 KG/M2

## 2023-06-30 DIAGNOSIS — H73.011 BULLOUS MYRINGITIS OF RIGHT EAR: ICD-10-CM

## 2023-06-30 DIAGNOSIS — H92.21 OTORRHAGIA OF RIGHT EAR: ICD-10-CM

## 2023-06-30 DIAGNOSIS — H61.21 IMPACTED CERUMEN OF RIGHT EAR: ICD-10-CM

## 2023-06-30 DIAGNOSIS — H92.02 LEFT EAR PAIN: Primary | ICD-10-CM

## 2023-06-30 PROCEDURE — 3008F BODY MASS INDEX DOCD: CPT | Mod: CPTII,S$GLB,, | Performed by: PHYSICIAN ASSISTANT

## 2023-06-30 PROCEDURE — 3008F PR BODY MASS INDEX (BMI) DOCUMENTED: ICD-10-PCS | Mod: CPTII,S$GLB,, | Performed by: PHYSICIAN ASSISTANT

## 2023-06-30 PROCEDURE — 99203 OFFICE O/P NEW LOW 30 MIN: CPT | Mod: 25,S$GLB,, | Performed by: PHYSICIAN ASSISTANT

## 2023-06-30 PROCEDURE — 1159F PR MEDICATION LIST DOCUMENTED IN MEDICAL RECORD: ICD-10-PCS | Mod: CPTII,S$GLB,, | Performed by: PHYSICIAN ASSISTANT

## 2023-06-30 PROCEDURE — 1159F MED LIST DOCD IN RCRD: CPT | Mod: CPTII,S$GLB,, | Performed by: PHYSICIAN ASSISTANT

## 2023-06-30 PROCEDURE — 69210 REMOVE IMPACTED EAR WAX UNI: CPT | Mod: S$GLB,,, | Performed by: PHYSICIAN ASSISTANT

## 2023-06-30 PROCEDURE — 99203 PR OFFICE/OUTPT VISIT, NEW, LEVL III, 30-44 MIN: ICD-10-PCS | Mod: 25,S$GLB,, | Performed by: PHYSICIAN ASSISTANT

## 2023-06-30 PROCEDURE — 1160F RVW MEDS BY RX/DR IN RCRD: CPT | Mod: CPTII,S$GLB,, | Performed by: PHYSICIAN ASSISTANT

## 2023-06-30 PROCEDURE — 69210 PR REMOVAL IMPACTED CERUMEN REQUIRING INSTRUMENTATION, UNILATERAL: ICD-10-PCS | Mod: S$GLB,,, | Performed by: PHYSICIAN ASSISTANT

## 2023-06-30 PROCEDURE — 1160F PR REVIEW ALL MEDS BY PRESCRIBER/CLIN PHARMACIST DOCUMENTED: ICD-10-PCS | Mod: CPTII,S$GLB,, | Performed by: PHYSICIAN ASSISTANT

## 2023-06-30 RX ORDER — AMOXICILLIN AND CLAVULANATE POTASSIUM 875; 125 MG/1; MG/1
1 TABLET, FILM COATED ORAL EVERY 12 HOURS
Qty: 20 TABLET | Refills: 0 | Status: SHIPPED | OUTPATIENT
Start: 2023-06-30 | End: 2023-07-10

## 2023-06-30 RX ORDER — CIPROFLOXACIN AND DEXAMETHASONE 3; 1 MG/ML; MG/ML
SUSPENSION/ DROPS AURICULAR (OTIC)
Qty: 7.5 ML | Refills: 0 | Status: SHIPPED | OUTPATIENT
Start: 2023-06-30 | End: 2023-11-28

## 2023-06-30 NOTE — PROGRESS NOTES
Subjective     Patient ID: Kalyn Kirby is a 18 y.o. female.    Chief Complaint: No chief complaint on file.    HPI    The pt is a 18 y.o. female with a history of pain in the the right ear. The pain has been present for 3 days. The pain is described as moderate. The pain is associated with swelling of the ear canal and drainage. There is no history of trauma, foreign body insertion, and sharp object insertion into the ear.    There is no prior history of tube insertion. There is no history of a known TM perforation on the affected side. There is no history of wetting the affected ear prior to the onset of the problem.      The patient has been treated with the following ear drops: no medications . The patient has been treated with the following antibiotics: no recent courses . There has been no relief with this treatment.       Recently dx with covid.    Review of Systems   Constitutional:  Positive for fever. Negative for chills and unexpected weight change.   HENT:  Positive for ear discharge, ear pain, sinus pressure/congestion and sore throat. Negative for facial swelling, hearing loss and trouble swallowing.    Eyes: Negative.  Negative for visual disturbance.   Respiratory:  Positive for cough and wheezing. Negative for stridor.    Cardiovascular: Negative.  Negative for chest pain.        No CHD   Gastrointestinal:  Negative for nausea and vomiting.        Neg for GERD   Endocrine: Negative.    Genitourinary: Negative.  Negative for enuresis.        Neg for congenital abn   Musculoskeletal: Negative.  Negative for arthralgias and myalgias.   Integumentary:  Negative for rash. Negative.   Neurological:  Positive for headaches. Negative for seizures and speech difficulty.   Hematological: Negative.  Negative for adenopathy. Does not bruise/bleed easily.   Psychiatric/Behavioral: Negative.  Negative for behavioral problems.         Objective     Physical Exam  Constitutional:       General: She is not  in acute distress.     Appearance: She is well-developed.   HENT:      Head: Normocephalic.      Right Ear: Ear canal and external ear normal. Drainage, swelling and tenderness present. A middle ear effusion is present. There is impacted cerumen.      Left Ear: Tympanic membrane, ear canal and external ear normal. No drainage, swelling or tenderness.  No middle ear effusion. There is no impacted cerumen.      Ears:      Comments:   Bullous myringitis AD     Nose: Nose normal. No nasal deformity.   Eyes:      General: Lids are normal.      Conjunctiva/sclera: Conjunctivae normal.      Pupils: Pupils are equal, round, and reactive to light.   Neck:      Thyroid: No thyroid mass.      Trachea: Trachea normal.   Cardiovascular:      Rate and Rhythm: Normal rate and regular rhythm.   Pulmonary:      Effort: Pulmonary effort is normal. No respiratory distress.      Breath sounds: Normal breath sounds.   Musculoskeletal:         General: Normal range of motion.      Cervical back: Normal range of motion.   Lymphadenopathy:      Cervical: No cervical adenopathy.   Skin:     General: Skin is warm.      Findings: No rash.   Neurological:      Mental Status: She is alert and oriented to person, place, and time.      Cranial Nerves: No cranial nerve deficit.   Psychiatric:         Speech: Speech normal.         Behavior: Behavior normal.         Thought Content: Thought content normal.     Cerumen removal: Ears cleared under microscopic vision with curette, forceps and suction as necessary. Child appropriately restrained by parent or/and papoose board.       Assessment and Plan     1. Left ear pain        2. Otorrhagia of right ear        3. Bullous myringitis of right ear        4. Impacted cerumen of right ear              PLAN:  Cdex and aug bid x 10days  Recheck ear in 2 weeks  Consult requested by:  Bre Castillo MD           No follow-ups on file.

## 2023-07-01 ENCOUNTER — PATIENT MESSAGE (OUTPATIENT)
Dept: ADMINISTRATIVE | Facility: OTHER | Age: 18
End: 2023-07-01
Payer: COMMERCIAL

## 2023-07-27 ENCOUNTER — OFFICE VISIT (OUTPATIENT)
Dept: OTOLARYNGOLOGY | Facility: CLINIC | Age: 18
End: 2023-07-27
Payer: COMMERCIAL

## 2023-07-27 VITALS — WEIGHT: 293 LBS | BODY MASS INDEX: 44.9 KG/M2

## 2023-07-27 DIAGNOSIS — H73.011 BULLOUS MYRINGITIS OF RIGHT EAR: Primary | ICD-10-CM

## 2023-07-27 PROCEDURE — 99213 PR OFFICE/OUTPT VISIT, EST, LEVL III, 20-29 MIN: ICD-10-PCS | Mod: S$GLB,,, | Performed by: PHYSICIAN ASSISTANT

## 2023-07-27 PROCEDURE — 99999 PR PBB SHADOW E&M-EST. PATIENT-LVL III: CPT | Mod: PBBFAC,,, | Performed by: PHYSICIAN ASSISTANT

## 2023-07-27 PROCEDURE — 3008F PR BODY MASS INDEX (BMI) DOCUMENTED: ICD-10-PCS | Mod: CPTII,S$GLB,, | Performed by: PHYSICIAN ASSISTANT

## 2023-07-27 PROCEDURE — 1159F MED LIST DOCD IN RCRD: CPT | Mod: CPTII,S$GLB,, | Performed by: PHYSICIAN ASSISTANT

## 2023-07-27 PROCEDURE — 1160F RVW MEDS BY RX/DR IN RCRD: CPT | Mod: CPTII,S$GLB,, | Performed by: PHYSICIAN ASSISTANT

## 2023-07-27 PROCEDURE — 99213 OFFICE O/P EST LOW 20 MIN: CPT | Mod: S$GLB,,, | Performed by: PHYSICIAN ASSISTANT

## 2023-07-27 PROCEDURE — 99999 PR PBB SHADOW E&M-EST. PATIENT-LVL III: ICD-10-PCS | Mod: PBBFAC,,, | Performed by: PHYSICIAN ASSISTANT

## 2023-07-27 PROCEDURE — 1160F PR REVIEW ALL MEDS BY PRESCRIBER/CLIN PHARMACIST DOCUMENTED: ICD-10-PCS | Mod: CPTII,S$GLB,, | Performed by: PHYSICIAN ASSISTANT

## 2023-07-27 PROCEDURE — 3008F BODY MASS INDEX DOCD: CPT | Mod: CPTII,S$GLB,, | Performed by: PHYSICIAN ASSISTANT

## 2023-07-27 PROCEDURE — 1159F PR MEDICATION LIST DOCUMENTED IN MEDICAL RECORD: ICD-10-PCS | Mod: CPTII,S$GLB,, | Performed by: PHYSICIAN ASSISTANT

## 2023-07-27 NOTE — PROGRESS NOTES
Subjective     Patient ID: Kalyn Kirby is a 18 y.o. female.    Chief Complaint: Follow-up    HPI    The pt is a 18 y.o. female returns to recheck ears. Last seen on 6/30/23 with bullous myringitis. Tx with cdex and augmentin. Doing well. Pain has greatly improved.       Review of Systems   Constitutional:  Positive for fever. Negative for chills and unexpected weight change.   HENT:  Negative for ear discharge, ear pain, facial swelling, hearing loss, sinus pressure/congestion, sore throat and trouble swallowing.    Eyes: Negative.  Negative for visual disturbance.   Respiratory:  Negative for cough, wheezing and stridor.    Cardiovascular: Negative.  Negative for chest pain.        No CHD   Gastrointestinal:  Negative for nausea and vomiting.        Neg for GERD   Endocrine: Negative.    Genitourinary: Negative.  Negative for enuresis.        Neg for congenital abn   Musculoskeletal: Negative.  Negative for arthralgias and myalgias.   Integumentary:  Negative for rash. Negative.   Allergic/Immunologic: Negative.    Neurological:  Negative for seizures, speech difficulty and headaches.   Hematological: Negative.  Negative for adenopathy. Does not bruise/bleed easily.   Psychiatric/Behavioral: Negative.  Negative for behavioral problems.         Objective     Physical Exam  Constitutional:       General: She is not in acute distress.     Appearance: She is well-developed.   HENT:      Head: Normocephalic.      Right Ear: Ear canal and external ear normal. Drainage, swelling and tenderness present. A middle ear effusion is present. There is impacted cerumen.      Left Ear: Tympanic membrane, ear canal and external ear normal. No drainage, swelling or tenderness.  No middle ear effusion. There is no impacted cerumen.      Ears:      Comments:   Bullous myringitis AD     Nose: Nose normal. No nasal deformity.   Eyes:      General: Lids are normal.      Conjunctiva/sclera: Conjunctivae normal.      Pupils:  Pupils are equal, round, and reactive to light.   Neck:      Thyroid: No thyroid mass.      Trachea: Trachea normal.   Cardiovascular:      Rate and Rhythm: Normal rate and regular rhythm.   Pulmonary:      Effort: Pulmonary effort is normal. No respiratory distress.      Breath sounds: Normal breath sounds.   Musculoskeletal:         General: Normal range of motion.      Cervical back: Normal range of motion.   Lymphadenopathy:      Cervical: No cervical adenopathy.   Skin:     General: Skin is warm.      Findings: No rash.   Neurological:      Mental Status: She is alert and oriented to person, place, and time.      Cranial Nerves: No cranial nerve deficit.   Psychiatric:         Speech: Speech normal.         Behavior: Behavior normal.         Thought Content: Thought content normal.     Cerumen removal: Ears cleared under microscopic vision with curette, forceps and suction as necessary. Child appropriately restrained by parent or/and papoose board.       Assessment and Plan     1. Bullous myringitis of right ear- resolved              PLAN:  RTC PRN         No follow-ups on file.

## 2023-09-12 ENCOUNTER — OFFICE VISIT (OUTPATIENT)
Dept: OTOLARYNGOLOGY | Facility: CLINIC | Age: 18
End: 2023-09-12
Payer: COMMERCIAL

## 2023-09-12 VITALS — WEIGHT: 293 LBS | BODY MASS INDEX: 44.02 KG/M2

## 2023-09-12 DIAGNOSIS — H61.21 IMPACTED CERUMEN OF RIGHT EAR: Primary | ICD-10-CM

## 2023-09-12 PROCEDURE — 69210 REMOVE IMPACTED EAR WAX UNI: CPT | Mod: S$GLB,,, | Performed by: PHYSICIAN ASSISTANT

## 2023-09-12 PROCEDURE — 1159F MED LIST DOCD IN RCRD: CPT | Mod: CPTII,S$GLB,, | Performed by: PHYSICIAN ASSISTANT

## 2023-09-12 PROCEDURE — 3008F BODY MASS INDEX DOCD: CPT | Mod: CPTII,S$GLB,, | Performed by: PHYSICIAN ASSISTANT

## 2023-09-12 PROCEDURE — 99213 PR OFFICE/OUTPT VISIT, EST, LEVL III, 20-29 MIN: ICD-10-PCS | Mod: 25,S$GLB,, | Performed by: PHYSICIAN ASSISTANT

## 2023-09-12 PROCEDURE — 1160F RVW MEDS BY RX/DR IN RCRD: CPT | Mod: CPTII,S$GLB,, | Performed by: PHYSICIAN ASSISTANT

## 2023-09-12 PROCEDURE — 69210 PR REMOVAL IMPACTED CERUMEN REQUIRING INSTRUMENTATION, UNILATERAL: ICD-10-PCS | Mod: S$GLB,,, | Performed by: PHYSICIAN ASSISTANT

## 2023-09-12 PROCEDURE — 3008F PR BODY MASS INDEX (BMI) DOCUMENTED: ICD-10-PCS | Mod: CPTII,S$GLB,, | Performed by: PHYSICIAN ASSISTANT

## 2023-09-12 PROCEDURE — 1159F PR MEDICATION LIST DOCUMENTED IN MEDICAL RECORD: ICD-10-PCS | Mod: CPTII,S$GLB,, | Performed by: PHYSICIAN ASSISTANT

## 2023-09-12 PROCEDURE — 99213 OFFICE O/P EST LOW 20 MIN: CPT | Mod: 25,S$GLB,, | Performed by: PHYSICIAN ASSISTANT

## 2023-09-12 PROCEDURE — 1160F PR REVIEW ALL MEDS BY PRESCRIBER/CLIN PHARMACIST DOCUMENTED: ICD-10-PCS | Mod: CPTII,S$GLB,, | Performed by: PHYSICIAN ASSISTANT

## 2023-09-12 PROCEDURE — 99999 PR PBB SHADOW E&M-EST. PATIENT-LVL III: CPT | Mod: PBBFAC,,, | Performed by: PHYSICIAN ASSISTANT

## 2023-09-12 PROCEDURE — 99999 PR PBB SHADOW E&M-EST. PATIENT-LVL III: ICD-10-PCS | Mod: PBBFAC,,, | Performed by: PHYSICIAN ASSISTANT

## 2023-09-12 NOTE — PROGRESS NOTES
Subjective     Patient ID: Kalyn Kirby is a 18 y.o. female.    Chief Complaint: Otitis Media    HPI    The pt is a 18 y.o. female returns to recheck ears. Pt thinks she needs ear cleaning. Worried she has fungal infection after looking in ear with camera.      Review of Systems   Constitutional:  Positive for fever. Negative for chills and unexpected weight change.   HENT:  Negative for ear discharge, ear pain, facial swelling, hearing loss, sinus pressure/congestion, sore throat and trouble swallowing.    Eyes: Negative.  Negative for visual disturbance.   Respiratory:  Negative for cough, wheezing and stridor.    Cardiovascular: Negative.  Negative for chest pain.        No CHD   Gastrointestinal:  Negative for nausea and vomiting.        Neg for GERD   Endocrine: Negative.    Genitourinary: Negative.  Negative for enuresis.        Neg for congenital abn   Musculoskeletal: Negative.  Negative for arthralgias and myalgias.   Integumentary:  Negative for rash. Negative.   Allergic/Immunologic: Negative.    Neurological: Negative.  Negative for seizures, speech difficulty and headaches.   Hematological: Negative.  Negative for adenopathy. Does not bruise/bleed easily.   Psychiatric/Behavioral: Negative.  Negative for behavioral problems.           Objective     Physical Exam  Constitutional:       General: She is not in acute distress.     Appearance: She is well-developed.   HENT:      Head: Normocephalic.      Right Ear: Ear canal and external ear normal. No drainage, swelling or tenderness. No middle ear effusion. There is impacted cerumen.      Left Ear: Tympanic membrane, ear canal and external ear normal. No drainage, swelling or tenderness.  No middle ear effusion. There is no impacted cerumen.      Ears:      Comments:        Nose: Nose normal. No nasal deformity.   Eyes:      General: Lids are normal.      Conjunctiva/sclera: Conjunctivae normal.      Pupils: Pupils are equal, round, and reactive  to light.   Neck:      Thyroid: No thyroid mass.      Trachea: Trachea normal.   Cardiovascular:      Rate and Rhythm: Normal rate and regular rhythm.   Pulmonary:      Effort: Pulmonary effort is normal. No respiratory distress.      Breath sounds: Normal breath sounds.   Musculoskeletal:         General: Normal range of motion.      Cervical back: Normal range of motion.   Lymphadenopathy:      Cervical: No cervical adenopathy.   Skin:     General: Skin is warm.      Findings: No rash.   Neurological:      Mental Status: She is alert and oriented to person, place, and time.      Cranial Nerves: No cranial nerve deficit.   Psychiatric:         Speech: Speech normal.         Behavior: Behavior normal.         Thought Content: Thought content normal.       Cerumen removal: Ears cleared under microscopic vision with curette, forceps and suction as necessary. Child appropriately restrained by parent or/and papoose board.       Assessment and Plan     1. Impacted cerumen of right ear              PLAN:  RTC PRN         No follow-ups on file.

## 2023-10-02 ENCOUNTER — HOSPITAL ENCOUNTER (OUTPATIENT)
Dept: RADIOLOGY | Facility: HOSPITAL | Age: 18
Discharge: HOME | End: 2023-10-02
Attending: STUDENT IN AN ORGANIZED HEALTH CARE EDUCATION/TRAINING PROGRAM
Payer: COMMERCIAL

## 2023-10-02 ENCOUNTER — OFFICE VISIT (OUTPATIENT)
Dept: PRIMARY CARE CLINIC | Facility: CLINIC | Age: 18
End: 2023-10-02
Payer: COMMERCIAL

## 2023-10-02 VITALS
SYSTOLIC BLOOD PRESSURE: 116 MMHG | HEIGHT: 69 IN | DIASTOLIC BLOOD PRESSURE: 68 MMHG | WEIGHT: 293 LBS | OXYGEN SATURATION: 98 % | HEART RATE: 103 BPM | BODY MASS INDEX: 43.4 KG/M2

## 2023-10-02 DIAGNOSIS — M25.562 LATERAL KNEE PAIN, LEFT: ICD-10-CM

## 2023-10-02 DIAGNOSIS — M25.562 LATERAL KNEE PAIN, LEFT: Primary | ICD-10-CM

## 2023-10-02 PROCEDURE — 99213 PR OFFICE/OUTPT VISIT, EST, LEVL III, 20-29 MIN: ICD-10-PCS | Mod: S$GLB,,, | Performed by: STUDENT IN AN ORGANIZED HEALTH CARE EDUCATION/TRAINING PROGRAM

## 2023-10-02 PROCEDURE — 3008F BODY MASS INDEX DOCD: CPT | Mod: CPTII,S$GLB,, | Performed by: STUDENT IN AN ORGANIZED HEALTH CARE EDUCATION/TRAINING PROGRAM

## 2023-10-02 PROCEDURE — 1159F MED LIST DOCD IN RCRD: CPT | Mod: CPTII,S$GLB,, | Performed by: STUDENT IN AN ORGANIZED HEALTH CARE EDUCATION/TRAINING PROGRAM

## 2023-10-02 PROCEDURE — 3078F PR MOST RECENT DIASTOLIC BLOOD PRESSURE < 80 MM HG: ICD-10-PCS | Mod: CPTII,S$GLB,, | Performed by: STUDENT IN AN ORGANIZED HEALTH CARE EDUCATION/TRAINING PROGRAM

## 2023-10-02 PROCEDURE — 3074F PR MOST RECENT SYSTOLIC BLOOD PRESSURE < 130 MM HG: ICD-10-PCS | Mod: CPTII,S$GLB,, | Performed by: STUDENT IN AN ORGANIZED HEALTH CARE EDUCATION/TRAINING PROGRAM

## 2023-10-02 PROCEDURE — 73562 X-RAY EXAM OF KNEE 3: CPT | Mod: TC,LT

## 2023-10-02 PROCEDURE — 73562 X-RAY EXAM OF KNEE 3: CPT | Mod: 26,LT,, | Performed by: RADIOLOGY

## 2023-10-02 PROCEDURE — 73562 XR KNEE 3 VIEW LEFT: ICD-10-PCS | Mod: 26,LT,, | Performed by: RADIOLOGY

## 2023-10-02 PROCEDURE — 99213 OFFICE O/P EST LOW 20 MIN: CPT | Mod: S$GLB,,, | Performed by: STUDENT IN AN ORGANIZED HEALTH CARE EDUCATION/TRAINING PROGRAM

## 2023-10-02 PROCEDURE — 3074F SYST BP LT 130 MM HG: CPT | Mod: CPTII,S$GLB,, | Performed by: STUDENT IN AN ORGANIZED HEALTH CARE EDUCATION/TRAINING PROGRAM

## 2023-10-02 PROCEDURE — 3078F DIAST BP <80 MM HG: CPT | Mod: CPTII,S$GLB,, | Performed by: STUDENT IN AN ORGANIZED HEALTH CARE EDUCATION/TRAINING PROGRAM

## 2023-10-02 PROCEDURE — 3008F PR BODY MASS INDEX (BMI) DOCUMENTED: ICD-10-PCS | Mod: CPTII,S$GLB,, | Performed by: STUDENT IN AN ORGANIZED HEALTH CARE EDUCATION/TRAINING PROGRAM

## 2023-10-02 PROCEDURE — 1159F PR MEDICATION LIST DOCUMENTED IN MEDICAL RECORD: ICD-10-PCS | Mod: CPTII,S$GLB,, | Performed by: STUDENT IN AN ORGANIZED HEALTH CARE EDUCATION/TRAINING PROGRAM

## 2023-10-02 PROCEDURE — 99999 PR PBB SHADOW E&M-EST. PATIENT-LVL IV: CPT | Mod: PBBFAC,,, | Performed by: STUDENT IN AN ORGANIZED HEALTH CARE EDUCATION/TRAINING PROGRAM

## 2023-10-02 PROCEDURE — 99999 PR PBB SHADOW E&M-EST. PATIENT-LVL IV: ICD-10-PCS | Mod: PBBFAC,,, | Performed by: STUDENT IN AN ORGANIZED HEALTH CARE EDUCATION/TRAINING PROGRAM

## 2023-10-09 NOTE — PROGRESS NOTES
INTERNAL MEDICINE SAME DAY PRIMARY CARE VISIT NOTE    Subjective:     Chief Complaint: Knee Pain       Patient ID: Kalyn Kirby is a 18 y.o. female, here today for focused same-day primary care visit.    Today, patient with complaint of L knee pain. 1 week ago had a fall forward onto her knee. Initially had difficulty putting pressure on knee which is improving. Continues to endorse mild pain and swelling of the knee. No clicking of the knee.    Past Medical History:  Past Medical History:   Diagnosis Date    Allergy     Asthma     Cough     Eczema     Headache(784.0)     Obesity     Rhinitis     seasonal    Snoring        Home Medications:  Prior to Admission medications    Medication Sig Start Date End Date Taking? Authorizing Provider   azelastine (ASTELIN) 137 mcg (0.1 %) nasal spray 1 spray (137 mcg total) by Nasal route 2 (two) times daily. 10/14/21 10/2/23 Yes Maria Luisa Costello FNP   ESTARYLLA 0.25-35 mg-mcg per tablet TAKE 1 TABLET BY MOUTH EVERY DAY 2/4/23  Yes Ashlee Regan MD   omeprazole (PRILOSEC) 40 MG capsule Take 1 capsule (40 mg total) by mouth every morning. 3/3/23 12/13/23 Yes Fatou Tarango MD   albuterol 90 mcg/actuation inhaler Inhale 2 puffs into the lungs every 4 (four) hours as needed for Wheezing.  Patient not taking: Reported on 6/30/2023 8/1/17   La Nena Soares MD   butalbital-acetaminophen-caffeine -40 mg (FIORICET, ESGIC) -40 mg per tablet 1-2 po prn headache onset; may be repeated x 1 in 4-6 hours  Patient not taking: Reported on 6/30/2023 12/27/18   Asya Collazo MD   ciprofloxacin-dexAMETHasone 0.3-0.1% (CIPRODEX) 0.3-0.1 % DrpS Place 4 drops into the affected ear(s) twice a day for 10 days. 6/30/23   Radha Canales, ANAND   clotrimazole-betamethasone (LOTRISONE) lotion Apply topically 2 (two) times daily.  Patient not taking: Reported on 6/30/2023 5/18/23   Edie Mejia, NP   diclofenac potassium (CAMBIA) 50 mg PwPk Take 1 Package by  mouth daily as needed.  Patient not taking: Reported on 5/30/2023 3/28/23 4/27/23  Ashley Chavez MD   dihydroergotamine (TRUDHESA) 0.725 mg/pump act. (4 mg/mL) Spry 0.725 mg by Nasal route daily as needed (migraine).  Patient not taking: Reported on 5/18/2023 3/28/23   Ashley Chavez MD   inhalation spacing device Use as directed for inhalation.  Patient not taking: Reported on 6/30/2023 1/17/18   La Nena Soares MD   ipratropium (ATROVENT) 42 mcg (0.06 %) nasal spray 2 sprays by Nasal route 3 (three) times daily.  Patient not taking: Reported on 6/30/2023 10/19/22   Kim Russell MD   mupirocin (BACTROBAN) 2 % ointment Apply topically 3 (three) times daily.  Patient not taking: Reported on 6/30/2023 10/12/20   Elena Canales MD   ondansetron (ZOFRAN) 4 MG tablet Take 1 tablet (4 mg total) by mouth every 6 (six) hours as needed for Nausea.  Patient not taking: Reported on 5/30/2023 7/20/22   Kim Russell MD   ondansetron (ZOFRAN-ODT) 4 MG TbDL DISSOLVE 1 tablet (4 mg total) by mouth every 8 (eight) hours as needed (nausea/ vomiting).  Patient not taking: Reported on 6/30/2023 4/8/23   Martina Rivers MD   triamcinolone acetonide 0.1% (KENALOG) 0.1 % ointment Apply topically 2 (two) times daily.  Patient not taking: Reported on 6/30/2023 10/12/20   Elena Canales MD       Allergies:  Review of patient's allergies indicates:   Allergen Reactions    No known drug allergies        Social History:  Social History     Tobacco Use    Smoking status: Never    Smokeless tobacco: Never    Tobacco comments:     MG smokes   Substance Use Topics    Alcohol use: Never    Drug use: Never         Review of Systems   Constitutional:  Negative for activity change and unexpected weight change.   HENT:  Negative for hearing loss, rhinorrhea and trouble swallowing.    Eyes:  Negative for discharge and visual disturbance.   Respiratory:  Negative for chest tightness and wheezing.   "  Cardiovascular:  Negative for chest pain and palpitations.   Gastrointestinal:  Negative for blood in stool, constipation, diarrhea and vomiting.   Endocrine: Negative for polydipsia and polyuria.   Genitourinary:  Negative for difficulty urinating, dysuria, hematuria and menstrual problem.   Musculoskeletal:  Positive for joint swelling. Negative for arthralgias and neck pain.   Neurological:  Negative for weakness and headaches.   Psychiatric/Behavioral:  Negative for confusion and dysphoric mood.            Objective:   /68 (BP Location: Right arm, Patient Position: Sitting, BP Method: Large (Manual))   Pulse 103   Ht 5' 9" (1.753 m)   Wt (!) 136.5 kg (300 lb 14.9 oz)   SpO2 98%   BMI 44.44 kg/m²        General: AAO x3, no apparent distress  HEENT: PERRL, OP clear  CV: RRR, no m/r/g  Pulm: Lungs CTAB, no crackles, no wheezes  Abd: s/NT/ND +BS  Extremities: no c/c/e    Labs:         Assessment/Plan     Kalyn was seen today for knee pain.    Diagnoses and all orders for this visit:    Lateral knee pain, left  -     X-Ray Knee 3 View Left; Future    Suspect pain 2/2 hematoma after fall. Alternate tylenol/ibuprofen    RTC prn     Bre Castillo MD  Department of Internal Medicine - [facility]        "

## 2023-11-09 ENCOUNTER — PATIENT MESSAGE (OUTPATIENT)
Dept: PRIMARY CARE CLINIC | Facility: CLINIC | Age: 18
End: 2023-11-09
Payer: COMMERCIAL

## 2023-11-09 DIAGNOSIS — K21.9 LPRD (LARYNGOPHARYNGEAL REFLUX DISEASE): ICD-10-CM

## 2023-11-09 DIAGNOSIS — J10.1 INFLUENZA A: ICD-10-CM

## 2023-11-09 DIAGNOSIS — J45.20 MILD INTERMITTENT ASTHMA WITHOUT COMPLICATION: ICD-10-CM

## 2023-11-09 RX ORDER — ALBUTEROL SULFATE 90 UG/1
2 AEROSOL, METERED RESPIRATORY (INHALATION) EVERY 4 HOURS PRN
Status: CANCELLED | OUTPATIENT
Start: 2023-11-09

## 2023-11-10 RX ORDER — OMEPRAZOLE 40 MG/1
40 CAPSULE, DELAYED RELEASE ORAL EVERY MORNING
Qty: 90 CAPSULE | Refills: 2 | Status: SHIPPED | OUTPATIENT
Start: 2023-11-10 | End: 2024-08-06

## 2023-11-13 DIAGNOSIS — T75.3XXD MOTION SICKNESS, SUBSEQUENT ENCOUNTER: Primary | ICD-10-CM

## 2023-11-13 RX ORDER — SCOLOPAMINE TRANSDERMAL SYSTEM 1 MG/1
1 PATCH, EXTENDED RELEASE TRANSDERMAL
Qty: 10 PATCH | Refills: 0 | Status: SHIPPED | OUTPATIENT
Start: 2023-11-13 | End: 2024-02-01

## 2023-11-13 RX ORDER — ONDANSETRON 4 MG/1
4 TABLET, ORALLY DISINTEGRATING ORAL EVERY 8 HOURS PRN
Qty: 30 TABLET | Refills: 0 | Status: SHIPPED | OUTPATIENT
Start: 2023-11-13

## 2023-11-20 RX ORDER — IPRATROPIUM BROMIDE 42 UG/1
2 SPRAY, METERED NASAL 3 TIMES DAILY
Qty: 30 ML | Refills: 0 | Status: SHIPPED | OUTPATIENT
Start: 2023-11-20

## 2023-11-28 ENCOUNTER — OFFICE VISIT (OUTPATIENT)
Dept: PODIATRY | Facility: CLINIC | Age: 18
End: 2023-11-28
Payer: COMMERCIAL

## 2023-11-28 VITALS
HEIGHT: 69 IN | DIASTOLIC BLOOD PRESSURE: 84 MMHG | RESPIRATION RATE: 18 BRPM | WEIGHT: 293 LBS | SYSTOLIC BLOOD PRESSURE: 121 MMHG | OXYGEN SATURATION: 97 % | BODY MASS INDEX: 43.4 KG/M2 | HEART RATE: 87 BPM | TEMPERATURE: 99 F

## 2023-11-28 DIAGNOSIS — L03.031 PARONYCHIA OF GREAT TOE OF RIGHT FOOT: Primary | ICD-10-CM

## 2023-11-28 DIAGNOSIS — L60.0 INGROWN NAIL OF GREAT TOE OF RIGHT FOOT: ICD-10-CM

## 2023-11-28 PROCEDURE — 1159F MED LIST DOCD IN RCRD: CPT | Mod: CPTII,S$GLB,, | Performed by: STUDENT IN AN ORGANIZED HEALTH CARE EDUCATION/TRAINING PROGRAM

## 2023-11-28 PROCEDURE — 3074F SYST BP LT 130 MM HG: CPT | Mod: CPTII,S$GLB,, | Performed by: STUDENT IN AN ORGANIZED HEALTH CARE EDUCATION/TRAINING PROGRAM

## 2023-11-28 PROCEDURE — 3079F DIAST BP 80-89 MM HG: CPT | Mod: CPTII,S$GLB,, | Performed by: STUDENT IN AN ORGANIZED HEALTH CARE EDUCATION/TRAINING PROGRAM

## 2023-11-28 PROCEDURE — 1160F PR REVIEW ALL MEDS BY PRESCRIBER/CLIN PHARMACIST DOCUMENTED: ICD-10-PCS | Mod: CPTII,S$GLB,, | Performed by: STUDENT IN AN ORGANIZED HEALTH CARE EDUCATION/TRAINING PROGRAM

## 2023-11-28 PROCEDURE — 99203 OFFICE O/P NEW LOW 30 MIN: CPT | Mod: S$GLB,,, | Performed by: STUDENT IN AN ORGANIZED HEALTH CARE EDUCATION/TRAINING PROGRAM

## 2023-11-28 PROCEDURE — 1159F PR MEDICATION LIST DOCUMENTED IN MEDICAL RECORD: ICD-10-PCS | Mod: CPTII,S$GLB,, | Performed by: STUDENT IN AN ORGANIZED HEALTH CARE EDUCATION/TRAINING PROGRAM

## 2023-11-28 PROCEDURE — 3074F PR MOST RECENT SYSTOLIC BLOOD PRESSURE < 130 MM HG: ICD-10-PCS | Mod: CPTII,S$GLB,, | Performed by: STUDENT IN AN ORGANIZED HEALTH CARE EDUCATION/TRAINING PROGRAM

## 2023-11-28 PROCEDURE — 3008F BODY MASS INDEX DOCD: CPT | Mod: CPTII,S$GLB,, | Performed by: STUDENT IN AN ORGANIZED HEALTH CARE EDUCATION/TRAINING PROGRAM

## 2023-11-28 PROCEDURE — 1160F RVW MEDS BY RX/DR IN RCRD: CPT | Mod: CPTII,S$GLB,, | Performed by: STUDENT IN AN ORGANIZED HEALTH CARE EDUCATION/TRAINING PROGRAM

## 2023-11-28 PROCEDURE — 3008F PR BODY MASS INDEX (BMI) DOCUMENTED: ICD-10-PCS | Mod: CPTII,S$GLB,, | Performed by: STUDENT IN AN ORGANIZED HEALTH CARE EDUCATION/TRAINING PROGRAM

## 2023-11-28 PROCEDURE — 99999 PR PBB SHADOW E&M-EST. PATIENT-LVL IV: ICD-10-PCS | Mod: PBBFAC,,, | Performed by: STUDENT IN AN ORGANIZED HEALTH CARE EDUCATION/TRAINING PROGRAM

## 2023-11-28 PROCEDURE — 3079F PR MOST RECENT DIASTOLIC BLOOD PRESSURE 80-89 MM HG: ICD-10-PCS | Mod: CPTII,S$GLB,, | Performed by: STUDENT IN AN ORGANIZED HEALTH CARE EDUCATION/TRAINING PROGRAM

## 2023-11-28 PROCEDURE — 99999 PR PBB SHADOW E&M-EST. PATIENT-LVL IV: CPT | Mod: PBBFAC,,, | Performed by: STUDENT IN AN ORGANIZED HEALTH CARE EDUCATION/TRAINING PROGRAM

## 2023-11-28 PROCEDURE — 99203 PR OFFICE/OUTPT VISIT, NEW, LEVL III, 30-44 MIN: ICD-10-PCS | Mod: S$GLB,,, | Performed by: STUDENT IN AN ORGANIZED HEALTH CARE EDUCATION/TRAINING PROGRAM

## 2023-11-28 RX ORDER — DOXYCYCLINE 100 MG/1
100 CAPSULE ORAL 2 TIMES DAILY
Qty: 20 CAPSULE | Refills: 0 | Status: SHIPPED | OUTPATIENT
Start: 2023-11-28 | End: 2024-02-01

## 2023-11-28 NOTE — PROGRESS NOTES
Subjective:     Patient    Kalyn Kirby is a 18 y.o. female.    Problem    Seen by Dr Pereira 5 years ago for bilateral 1st toenail partial avulsions of both borders with phenol matrixectomies. No issues with nails since until about 1 week ago when then medial border of the right 1st nail began hurting. She attempted ingrown nail cream/stickers and attempted to remove the painful part of the nail herself, she was able to express some pus and there has been some improvement in pain and drainage since.      History    History obtained from patient and review of medical records.     Past Medical History:   Diagnosis Date    Allergy     Asthma     Cough     Eczema     Headache(784.0)     Obesity     Rhinitis     seasonal    Snoring        Past Surgical History:   Procedure Laterality Date    ADENOIDECTOMY      TONSILLECTOMY      TYMPANOSTOMY TUBE PLACEMENT          Objective:     Vitals  Wt Readings from Last 1 Encounters:   11/28/23 (!) 138.9 kg (306 lb 3.5 oz)     Temp Readings from Last 1 Encounters:   11/28/23 98.9 °F (37.2 °C)     BP Readings from Last 1 Encounters:   11/28/23 121/84     Pulse Readings from Last 1 Encounters:   11/28/23 87       Dermatological Exam    Skin:  Pedal hair growth, skin color, and skin texture normal on right; right 1st toe medial nail fold mildly infected  Pedal hair growth, skin color, and skin texture normal on left    Nails:  All nails normal in length, thickness, color; right 1st toenail medial border recently cut, edge visible, mildly tender medial border    Vascular Exam    Arteries:  Posterior tibial artery palpable on right  Dorsalis pedis artery palpable on right  Posterior tibial artery palpable on left  Dorsalis pedis artery palpable on left    Veins:  Superficial veins unremarkable on right  Superficial veins unremarkable on left    Swelling:  None on right  None on left    Neurological Exam    [place] touch test:  6/6 sites sensed, light touch intact      Musculoskeletal Exam    Footwear:  Casual on right  Casual on left    Gait Exam:   Ambulatory Status: Ambulatory  Gait: Normal  Assistive Devices: None    Foot Progression Angle:  Normal on right  Normal on left     Right Lower Extremity Additional Findings:  Right foot and ankle function, strength, and range of motion unremarkable except as noted above.     Left Lower Extremity Additional Findings:  Left foot and ankle function, strength, and range of motion unremarkable except as noted above.    Imaging and Other Tests    Imaging:  Independently reviewed and interpreted imaging, findings are as follows: N/A     Assessment:     Encounter Diagnoses   Name Primary?    Paronychia of great toe of right foot Yes    Ingrown nail of great toe of right foot         Plan:     I counseled the patient on her conditions, their implications and medical management.    Right 1st toenail medial border ingrown, infected: acute  -Recommended monitoring as infection and pain are already improving.  -Ordered doxycycline.   -If issue does not fully resolve or recurs then recommend return for repeat partial nail avulsion with phenol matrixectomy. May need pre-procedure lorazepam. May need more local anesthetic before procedure.     Return to clinic PRN.

## 2024-01-19 DIAGNOSIS — N92.6 IRREGULAR MENSES: ICD-10-CM

## 2024-01-19 RX ORDER — NORGESTIMATE AND ETHINYL ESTRADIOL 0.25-0.035
KIT ORAL
Qty: 84 TABLET | Refills: 0 | Status: SHIPPED | OUTPATIENT
Start: 2024-01-19 | End: 2024-02-04 | Stop reason: SDUPTHER

## 2024-01-19 NOTE — TELEPHONE ENCOUNTER
Refill Routing Note   Medication(s) are not appropriate for processing by [facility] for the following reason(s):        Patient not seen by provider within 15 months    [facility] action(s):  Defer               Appointments  past 12m or future 3m with PCP    Date Provider   Last Visit   1/25/2021 Ashlee Regan MD   Next Visit   Visit date not found Ashlee Regan MD   ED visits in past 90 days: 0        Note composed:11:21 AM 01/19/2024          
None

## 2024-02-01 ENCOUNTER — OFFICE VISIT (OUTPATIENT)
Dept: PRIMARY CARE CLINIC | Facility: CLINIC | Age: 19
End: 2024-02-01
Payer: COMMERCIAL

## 2024-02-01 VITALS
OXYGEN SATURATION: 97 % | WEIGHT: 293 LBS | SYSTOLIC BLOOD PRESSURE: 124 MMHG | DIASTOLIC BLOOD PRESSURE: 70 MMHG | HEART RATE: 98 BPM | HEIGHT: 69 IN | BODY MASS INDEX: 43.4 KG/M2

## 2024-02-01 DIAGNOSIS — L25.9 CONTACT DERMATITIS, UNSPECIFIED CONTACT DERMATITIS TYPE, UNSPECIFIED TRIGGER: Primary | ICD-10-CM

## 2024-02-01 DIAGNOSIS — E66.01 CLASS 3 SEVERE OBESITY IN ADULT, UNSPECIFIED BMI, UNSPECIFIED OBESITY TYPE, UNSPECIFIED WHETHER SERIOUS COMORBIDITY PRESENT: ICD-10-CM

## 2024-02-01 PROCEDURE — 3078F DIAST BP <80 MM HG: CPT | Mod: CPTII,S$GLB,, | Performed by: STUDENT IN AN ORGANIZED HEALTH CARE EDUCATION/TRAINING PROGRAM

## 2024-02-01 PROCEDURE — 1159F MED LIST DOCD IN RCRD: CPT | Mod: CPTII,S$GLB,, | Performed by: STUDENT IN AN ORGANIZED HEALTH CARE EDUCATION/TRAINING PROGRAM

## 2024-02-01 PROCEDURE — 99999 PR PBB SHADOW E&M-EST. PATIENT-LVL IV: CPT | Mod: PBBFAC,,, | Performed by: STUDENT IN AN ORGANIZED HEALTH CARE EDUCATION/TRAINING PROGRAM

## 2024-02-01 PROCEDURE — 99214 OFFICE O/P EST MOD 30 MIN: CPT | Mod: S$GLB,,, | Performed by: STUDENT IN AN ORGANIZED HEALTH CARE EDUCATION/TRAINING PROGRAM

## 2024-02-01 PROCEDURE — 3008F BODY MASS INDEX DOCD: CPT | Mod: CPTII,S$GLB,, | Performed by: STUDENT IN AN ORGANIZED HEALTH CARE EDUCATION/TRAINING PROGRAM

## 2024-02-01 PROCEDURE — 1160F RVW MEDS BY RX/DR IN RCRD: CPT | Mod: CPTII,S$GLB,, | Performed by: STUDENT IN AN ORGANIZED HEALTH CARE EDUCATION/TRAINING PROGRAM

## 2024-02-01 PROCEDURE — 3074F SYST BP LT 130 MM HG: CPT | Mod: CPTII,S$GLB,, | Performed by: STUDENT IN AN ORGANIZED HEALTH CARE EDUCATION/TRAINING PROGRAM

## 2024-02-01 RX ORDER — TRIAMCINOLONE ACETONIDE 1 MG/G
CREAM TOPICAL 2 TIMES DAILY
Qty: 80 G | Refills: 3 | Status: SHIPPED | OUTPATIENT
Start: 2024-02-01

## 2024-02-04 DIAGNOSIS — N92.6 IRREGULAR MENSES: ICD-10-CM

## 2024-02-04 RX ORDER — NORGESTIMATE AND ETHINYL ESTRADIOL 0.25-0.035
1 KIT ORAL DAILY
Qty: 84 TABLET | Refills: 0 | Status: SHIPPED | OUTPATIENT
Start: 2024-02-04 | End: 2024-04-24

## 2024-02-05 NOTE — PROGRESS NOTES
Subjective:       Patient ID: Kalyn Kirby is a 18 y.o. female.   Chief Complaint: Rash      Presents today to discuss a rash. Rash has been present for about 3 weeks ago. Rash started on L arm, moved to R arm, back and lower extremities. Areas itch. No crusting or drainage.     Review of Systems   Constitutional:  Negative for fatigue and fever.   Respiratory:  Negative for cough and shortness of breath.    Cardiovascular:  Negative for chest pain.   Gastrointestinal:  Negative for abdominal pain, diarrhea, nausea and vomiting.   Musculoskeletal:  Negative for back pain.   Integumentary:  Positive for rash.   Neurological:  Negative for weakness.              Objective:        Physical Exam  HENT:      Head: Normocephalic and atraumatic.      Nose: Nose normal.      Mouth/Throat:      Mouth: Mucous membranes are moist.      Pharynx: Oropharynx is clear.   Eyes:      Extraocular Movements: Extraocular movements intact.      Conjunctiva/sclera: Conjunctivae normal.      Pupils: Pupils are equal, round, and reactive to light.   Cardiovascular:      Rate and Rhythm: Normal rate.   Pulmonary:      Effort: Pulmonary effort is normal.   Musculoskeletal:         General: No swelling. Normal range of motion.      Cervical back: Normal range of motion.      Right lower leg: No edema.      Left lower leg: No edema.   Skin:     General: Skin is warm.      Findings: No lesion or rash.   Neurological:      General: No focal deficit present.      Mental Status: She is alert and oriented to person, place, and time.      Motor: No weakness.   Psychiatric:         Mood and Affect: Mood normal.         Thought Content: Thought content normal.         Assessment:         Problem List Items Addressed This Visit          Endocrine    Class 3 severe obesity in adult, unspecified BMI, unspecified obesity type, unspecified whether serious comorbidity present     Other Visit Diagnoses       Contact dermatitis, unspecified contact  dermatitis type, unspecified trigger    -  Primary    Relevant Medications    triamcinolone acetonide 0.1% (KENALOG) 0.1 % cream              Plan:         1. Contact dermatitis, unspecified contact dermatitis type, unspecified trigger  -     triamcinolone acetonide 0.1% (KENALOG) 0.1 % cream; Apply topically 2 (two) times daily.  Dispense: 80 g; Refill: 3  Advised OTC sensitive skin daily moisturizer (ex Vanicream or Cerave), lukewarm baths, using humidifier in dry/cold weather, wearing cotton fabrics, and using a mild soap or a non-soap cleanser when washing.    2. Class 3 severe obesity in adult, unspecified BMI, unspecified obesity type, unspecified whether serious comorbidity present  Continue working on improving diet and including exercise daily.        Bre Castillo MD

## 2024-03-27 ENCOUNTER — OFFICE VISIT (OUTPATIENT)
Dept: PSYCHIATRY | Facility: CLINIC | Age: 19
End: 2024-03-27
Payer: COMMERCIAL

## 2024-03-27 DIAGNOSIS — R69 DIAGNOSIS DEFERRED: Primary | ICD-10-CM

## 2024-03-27 PROCEDURE — 90791 PSYCH DIAGNOSTIC EVALUATION: CPT | Mod: S$GLB,,, | Performed by: SOCIAL WORKER

## 2024-04-05 ENCOUNTER — TELEPHONE (OUTPATIENT)
Dept: NEUROLOGY | Facility: CLINIC | Age: 19
End: 2024-04-05
Payer: COMMERCIAL

## 2024-04-05 NOTE — TELEPHONE ENCOUNTER
Spoke to Pt about rescheduling her appt that was scheduled incorrectly. Pt tristen be rescheduled for April 16 at [place].

## 2024-04-16 ENCOUNTER — OFFICE VISIT (OUTPATIENT)
Dept: NEUROLOGY | Facility: CLINIC | Age: 19
End: 2024-04-16
Payer: COMMERCIAL

## 2024-04-16 VITALS
DIASTOLIC BLOOD PRESSURE: 84 MMHG | HEART RATE: 112 BPM | SYSTOLIC BLOOD PRESSURE: 134 MMHG | BODY MASS INDEX: 47.21 KG/M2 | WEIGHT: 293 LBS

## 2024-04-16 DIAGNOSIS — G43.019 INTRACTABLE MIGRAINE WITHOUT AURA AND WITHOUT STATUS MIGRAINOSUS: ICD-10-CM

## 2024-04-16 DIAGNOSIS — G43.719 CHRONIC MIGRAINE WITHOUT AURA, INTRACTABLE, WITHOUT STATUS MIGRAINOSUS: Primary | ICD-10-CM

## 2024-04-16 PROCEDURE — G2211 COMPLEX E/M VISIT ADD ON: HCPCS | Mod: S$GLB,,, | Performed by: NURSE PRACTITIONER

## 2024-04-16 PROCEDURE — 3079F DIAST BP 80-89 MM HG: CPT | Mod: CPTII,S$GLB,, | Performed by: NURSE PRACTITIONER

## 2024-04-16 PROCEDURE — 3075F SYST BP GE 130 - 139MM HG: CPT | Mod: CPTII,S$GLB,, | Performed by: NURSE PRACTITIONER

## 2024-04-16 PROCEDURE — 3008F BODY MASS INDEX DOCD: CPT | Mod: CPTII,S$GLB,, | Performed by: NURSE PRACTITIONER

## 2024-04-16 PROCEDURE — 99215 OFFICE O/P EST HI 40 MIN: CPT | Mod: S$GLB,,, | Performed by: NURSE PRACTITIONER

## 2024-04-16 PROCEDURE — 1159F MED LIST DOCD IN RCRD: CPT | Mod: CPTII,S$GLB,, | Performed by: NURSE PRACTITIONER

## 2024-04-16 PROCEDURE — 1160F RVW MEDS BY RX/DR IN RCRD: CPT | Mod: CPTII,S$GLB,, | Performed by: NURSE PRACTITIONER

## 2024-04-16 PROCEDURE — 99999 PR PBB SHADOW E&M-EST. PATIENT-LVL III: CPT | Mod: PBBFAC,,, | Performed by: NURSE PRACTITIONER

## 2024-04-16 RX ORDER — ONDANSETRON 8 MG/1
8 TABLET, ORALLY DISINTEGRATING ORAL EVERY 8 HOURS PRN
Qty: 30 TABLET | Refills: 2 | Status: SHIPPED | OUTPATIENT
Start: 2024-04-16

## 2024-04-16 RX ORDER — UBROGEPANT 100 MG/1
100 TABLET ORAL
Qty: 16 TABLET | Refills: 5 | Status: SHIPPED | OUTPATIENT
Start: 2024-04-16

## 2024-04-16 NOTE — PROGRESS NOTES
NEW PATIENT CONSULT  SUBJECTIVE:  Patient ID: Kalyn Kirby   MRN: [id number]  Referred By: No ref. provider found  Chief Complaint: Headache    History of Present Illness:   18 y.o. female with chronic migraine, ADD, obesity, who presents to clinic with her mother for evaluation of headaches.  Previous patient of Dr. Chavez, she is a new patient to me.  She has continued to experience near daily headaches with migraines at least 15 days per month.   She has tried nurtec and ubrelvy, both of which she feels helps, feels ubrelvy is more effective than nurtec.  She has taken multiple preventive options over the years including topiramate, amitriptyline.  Was previously recommended to proceed with Botox, did not proceed with botox in the past, is ready to begin therapy now.     Notes from Dr. Chavez:  Chief Complaint: Headache     The patient is a 18 y/o female with long history of migraine, previously seen by a child neurologist last in 2017 who is retired now. She reports having been treated with a medication that also works for anxiety. She used topamax which initially helped but her headaches have started to increase in frequency. She has also tried Ubrelvy and Rizatriptan (both of which were supplied by the patient's mom). Ubrelvy and rizatriptan lowers the intensity of the headache.     Headaches are bifrontal, pressure like, throbbing. No particular time of the day, lasting hours to days.      In the past 30 days, 2 headache-FREE days, 28 HA days (mostly moderate 5-7/10, 4-5 instances of severe 8-9/10 headaches). They've become daily in the past month and prior to that she had 4-5      Strong maternal history of migraines, with multiple siblings living with migraines.        Headache History:  Headache questionnaire     Headache Impact Test (HIT-6): 55     1. When did your Headaches start?               Approximately 2013 (age of 8)     2. Where are your headaches located?              Mostly frontal,  bilaterally     3. Your headache's characteristics:              Pressure, Like a tight band, Throbbing/Pounding, Sharp     4. How long does the headache last?              Hours-Days      5. How often does the headache occur?              daily     6. Are your headaches preceded or accompanied by other symptoms? yes              If yes, please describe.  Nausea, light sensitivity        7. Does the headache awaken you at night? yes              If so, how often? Once a week     8. Please ehsan the word that best describes your headache's intensity:               moderate     9. Using a scale of 1 through 10, with 0 = no pain and 10 = the worst pain:              What score is your headache now? 6              What score is your headache at its worst? 8              What score is your headache at its best? 3           10. Possible associated headache symptoms:  [x]  Sensitivity to light              [x] Dizziness                [] Nasal or sinus pressure/ pain          [x] Sensitivity to noise             [] Vertigo                    [x] Problems with concentration  [] Sensitivity to smells           [x] Ringing in ears       [] Problems with memory                    [x] Blurred vision                     [] Irritability                 [] Problems with task completion   [] Double vision                     [] Anger                      [x]  Problems with relaxation  [x] Loss of appetite                  [] Anxiety                   [x] Neck tightness, Neck pain  [x] Nausea                               [x] Nasal congestion  [] Vomiting                                     11. Headache improving factors:  [x] Sleep                      [] Heat  [x] Darkness                [x] Ice  [] Local pressure       [] Menses (period)  [] Massage                 [] Medications:          12. Headache worsening factors:   [] Fatigue       [x] Sneezing                [x] Changes in Weather  [x] Light           [] Bending Over          [] Stress  [x] Noise          [] Ovulation                [] Multiple Sclerosis Flare-Up  [] Smells        [] Menses                   [] Food   [] Coughing    [] Alcohol        13. Number of caffeinated drinks per day: 1 every other day        14. Number of diet drinks per day:  1 every other week        15. Have you seen any other [facility] within the last 3 years?  no     Please Check any Medications or Procedures tried/failed for Headache     AED Neuromodulators   MAOIs   Ergot Alkaloids     Acetazolamide (Diamox) []  Phenelzine (Nardil) []  Dihydroergotamine (Migranal) []    Carbamazepine (Tegretol) []  Tranylcypromine (Parnate) []  Ergotamine (Ergomar) []    Gabapentin (Neurontin) []  Antihistamine/Serotonergic   Triptans     Lacosamide (Vimpat) []  Cyproheptadine (Periactin) []  Almotriptan (Axert) []    Lamotrigine (Lamictal) []  Antihypertensives   Eletriptan (Relpax) []    Levatiracetam (Keppra) []  Atenolol (Tenormin) []  Frovatriptan (Frova) []    Oxcarbazepine (Trileptal) []  Bisoprostol (Zebeta) []  Naratriptan (Amerge) []    Phenobarbital []  Candesartan (Atacand) []  Rizatriptan (Maxalt) [x]        Nebivolol (Bystolic)   Sumatriptan (Imitrex) [x]    Levetiracetam (Keppra)   Cardeilol (Coreg) []  Zolmitriptan (Zomig) []    Phenytoin (Dilantin) []  Diltiazem (Cardizem) []        Pregabalin (Lyrica) []  Lisinopril (Prinivil, Zestril) []  Combo Abortives     Primidone (Mysoline) []  Metoprolol (Toprol) []  BC Powder []    Tiagabine (Gabatril) []  Nadolol (Corgard) []  Butalbital and Acetaminophen (Bupap) []    Topiramate (Topamax)  (Trokendi) [x]  Nicardipine (Cardene) []      Vigabatrin (Sabril) []  Nimodipine (Nimotop) []  Butalbital, Acetaminophen, and caffeine (Fioricet) []    Valproic Acid (Depakote) (Divalproex Sodium) []  Propranolol (Inderal) []      Zonisamide (Zonegran) []  Telmisartan (Micardis) []  Butalbital, Aspirin, and caffeine (Fiorinal) []    Benzodiazepines   Timolol  (Blocadren) []      Alprazolam (Xanax) [x]  Verapamil (Calan, Verelan) []  Butalbital, Caffeine, Acetaminophen, and Codeine (Fioricet with Codeine) []    Diazepam (Valium) []  NSAIDs       Lorazepam (Ativan) []  Acetaminophen (Tylenol) [x]      Clonazepam (Klonopin) []  Acetylsalicylic Acid (Aspirin) []  Butalbital, Caffeine, Aspirin, and Codeine  (Fiorinal with Codeine) []    Antidepressants   Diclofenac (Cambia) []      Amitriptyline (Elavil) [x]  Ibuprofen (Motrin) [x]      Desipramine (Norpramin) []  Indomethacin (Indocin) []  Aspirin, Caffeine, and Acetaminophen (Excedrin) (Goodys) []    Doxepin (Sinequan) []  Ketoprofen (Orudis) []      Fluoxetine (Prozac) []  Ketorolac (Toradol) [x]  Acetaminophen, Dichloralphenazone, and Isometheptene (Midrin) []    Imipramine (Tofranil) []  Naproxen (Anaprox) (Aleve) [x]      Nortriptyline (Pamelor) []  Meclofenamic Acid (Meclomen) []      Venlafaxine (Effexor) []  Meloxicam (Mobic) []  Procedures     Desvenlafazine (Pristiq) []  Monoclonals   Greater occipital nerve block []    Duloxetine (Cymbalta) []  Eptinezumab []  Cervical, Thoracic, Lumbar radiofrequency ablation []    Trazadone []  Erenumab-aooe (Aimovig) []  Spenopalatine ganglion block []    Wellbutrin [x]  Galcanezumab (Emgality) []  Occipital neuro stimulation []    Protriptyline (Vivactil) []  Fremanazumab-vfrm (Ajovy)   Cervical, Thoracic, Lumbar, Caudal Epidural steroid injection []    Escitalopram (Lexapro) []  Other []  Sacroiliac joint steroid injection []    Celexa []  Memantine (Namenda) []  Transforaminal epidural steroid injection []    Ubrelvy []  Botox []  Facet joint injections []        Baclofen (Lioresal) []  Cervical, Thoracic, Lumbar medial branch blocks []            Cefaly []            Gamma Core []            Iovera []            Transcranial Magnetic stimulation []                                   Current Medications:    albuterol 90 mcg/actuation inhaler, Inhale 2 puffs into the lungs  every 4 (four) hours as needed for Wheezing., Disp: 1 Inhaler, Rfl: 3    clindamycin (CLEOCIN T) 1 % external solution, Apply twice a day to bumps on scalp, Disp: 60 mL, Rfl: 4    clobetasol 0.05% (TEMOVATE) 0.05 % Oint, Apply twice a day as needed to bumps on body from bug bites, Disp: 45 g, Rfl: 3    clotrimazole-betamethasone (LOTRISONE) lotion, Apply topically 2 (two) times daily., Disp: 30 mL, Rfl: 0    inhalation spacing device, Use as directed for inhalation., Disp: 1 Device, Rfl: 0    ipratropium (ATROVENT) 42 mcg (0.06 %) nasal spray, 2 sprays by Each Nostril route 3 (three) times daily., Disp: 30 mL, Rfl: 0    mupirocin (BACTROBAN) 2 % ointment, Apply topically 3 (three) times daily., Disp: 1 Tube, Rfl: 0    norgestimate-ethinyl estradioL (ESTARYLLA) 0.25-35 mg-mcg per tablet, Take 1 tablet by mouth once daily., Disp: 84 tablet, Rfl: 0    omeprazole (PRILOSEC) 40 MG capsule, Take 1 capsule (40 mg total) by mouth every morning., Disp: 90 capsule, Rfl: 2    triamcinolone acetonide 0.1% (KENALOG) 0.1 % cream, Apply topically 2 (two) times daily., Disp: 80 g, Rfl: 3    triamcinolone acetonide 0.1% (KENALOG) 0.1 % ointment, Apply topically 2 (two) times daily., Disp: 30 g, Rfl: 1    azelastine (ASTELIN) 137 mcg (0.1 %) nasal spray, 1 spray (137 mcg total) by Nasal route 2 (two) times daily., Disp: 30 mL, Rfl: 0    ondansetron (ZOFRAN-ODT) 8 MG TbDL, Dissolve 1 tablet (8 mg total) on the tongue every 8 (eight) hours as needed (nausea)., Disp: 30 tablet, Rfl: 2    ubrogepant (UBRELVY) 100 mg tablet, Take 1 tablet (100 mg total) by mouth every 2 (two) hours as needed for Migraine. If symptoms persist or return, may repeat dose after 2 hours. Maximum: 200 mg per 24 hours, Disp: 16 tablet, Rfl: 5    Current Facility-Administered Medications:     onabotulinumtoxina injection 200 Units, 200 Units, Intramuscular, Q90 Days, Ashley Chavez MD    Review of Systems - A review of 10+ systems was conducted with  pertinent positive and negative findings documented in HPI with all other systems reviewed and negative.    PFSH: Past medical, family, and social history reviewed as documented in chart with pertinent positive medical, family, and social history detailed in HPI.    OBJECTIVE:  Vitals:  /84 (BP Location: Left arm, Patient Position: Sitting, BP Method: Large (Automatic))   Pulse (!) 112   Wt (!) 145 kg (319 lb 10.7 oz)   BMI 47.21 kg/m²      Physical Exam:  Constitutional: she appears well-developed and well-nourished. she is well groomed. NAD    Review of Data:   Notes from psychiatry, primary care, ENT, Dr. Chavez reviewed   Labs:  No visits with results within 6 Month(s) from this visit.   Latest known visit with results is:   Office Visit on 04/08/2023   Component Date Value Ref Range Status    POC Preg Test, Ur 04/08/2023 Negative  Negative Final     Acceptable 04/08/2023 Yes   Final    POC Blood, Urine 04/08/2023 Negative  Negative Final    POC Bilirubin, Urine 04/08/2023 Positive (A)  Negative Final    POC Urobilinogen, Urine 04/08/2023 abnormal  0.1 - 1.1 Final    POC Ketones, Urine 04/08/2023 Negative  Negative Final    POC Protein, Urine 04/08/2023 Positive (A)  Negative Final    POC Nitrates, Urine 04/08/2023 Negative  Negative Final    POC Glucose, Urine 04/08/2023 Negative  Negative Final    pH, UA 04/08/2023 6.0  5 - 8 Final    POC Specific Gravity, Urine 04/08/2023 1.025  1.003 - 1.029 Final    POC Leukocytes, Urine 04/08/2023 Negative  Negative Final    Urine Culture, Routine 04/08/2023 Multiple organisms isolated. None in predominance.  Repeat if   Final    Urine Culture, Routine 04/08/2023 clinically necessary.   Final      Latest Reference Range & Units 10/14/22 08:57   WBC 3.90 - 12.70 K/uL 9.13   RBC 4.00 - 5.40 M/uL 4.46   Hemoglobin 12.0 - 16.0 g/dL 12.1   Hematocrit 37.0 - 48.5 % 38.3   MCV 82 - 98 fL 86   MCH 27.0 - 31.0 pg 27.1   MCHC 32.0 - 36.0 g/dL 31.6 (L)   RDW  11.5 - 14.5 % 13.2   Platelet Count 150 - 450 K/uL 442   MPV 9.2 - 12.9 fL 9.5   Sodium 136 - 145 mmol/L 139   Potassium 3.5 - 5.1 mmol/L 3.8   Chloride 95 - 110 mmol/L 106   CO2 23 - 29 mmol/L 23   Anion Gap 8 - 16 mmol/L 10   BUN 6 - 20 mg/dL 12   Creatinine 0.5 - 1.4 mg/dL 0.7   eGFR >60 mL/min/1.73 m^2 >60.0   Glucose 70 - 110 mg/dL 72   Calcium 8.7 - 10.5 mg/dL 9.4   ALP 55 - 135 U/L 46 (L)   PROTEIN TOTAL 6.0 - 8.4 g/dL 7.5   Albumin 3.5 - 5.2 g/dL 3.5   BILIRUBIN TOTAL 0.1 - 1.0 mg/dL 0.4   AST 10 - 40 U/L 19   ALT 10 - 44 U/L 13   Cholesterol Total 120 - 199 mg/dL 195   HDL 40 - 75 mg/dL 40   HDL/Cholesterol Ratio 20.0 - 50.0 % 20.5   Non-HDL Cholesterol mg/dL 155   Total Cholesterol/HDL Ratio 2.0 - 5.0  4.9   Triglycerides 30 - 150 mg/dL 129   LDL Cholesterol 63.0 - 159.0 mg/dL 129.2   Hemoglobin A1C External 4.0 - 5.6 % 5.1   Estimated Avg Glucose 68 - 131 mg/dL 100   Hepatitis C Ab Non-reactive  Non-reactive   HIV 1/2 Ag/Ab Non-reactive  Non-reactive   (L): Data is abnormally low  Imaging:  Results for orders placed or performed during the hospital encounter of 08/09/17   MRI Brain Without Contrast    Narrative    MRI brain without contrast    08/09/17 14:44:31    Accession# [id number]    CLINICAL INDICATION: 12 year old F with headache and visual disturbance.    TECHNIQUE: Multiplanar multisequence MR imaging of the brain was performed without the use of intravenous contrast.    COMPARISON: No priors.    FINDINGS:    The ventricles are normal in size for age, without evidence of hydrocephalus.    The brain appears within normal limits. No parenchymal mass, hemorrhage, edema or infarction.    No extra-axial blood or fluid collections.    The T2 skull base flow voids are preserved. Bone marrow signal intensity is unremarkable.    Impression    No evidence of acute intracranial pathology. ______________________________________     Electronically signed by resident: GINNA LONGORIA MD  Date:      08/09/17  Time:    16:06            As the supervising and teaching physician, I personally reviewed the images and resident's interpretation and I agree with the findings.          Electronically signed by: MARCUS MIRZA MD  Date:     08/09/17  Time:    16:28      Note: I have independently reviewed any/all imaging/labs/tests and agree with the report (s) as documented.  Any discrepancies will be as noted/demarcated by free text.  HIMANSHU LUCAS 4/16/2024    ASSESSMENT:  1. Chronic migraine without aura, intractable, without status migrainosus    2. Intractable migraine without aura and without status migrainosus      Medical Decision Making:  BOTOX  The patient has chronic migraines ( G43.719) and suffers from headaches more than 15 days per month, each lasting more than 4 hours with at least 8 attacks that meet criteria for migraine. She has tried multiple medications including but not limited to topiramate, amitriptyline, sumatriptan, rizatriptan, uberlvy (see full list above). The patient is an ideal candidate for Botox. After treatment, I expect 50%  improvement in the patient's symptoms. A reduction of at least 7 days per month and the number of cumulative hours suffering with headaches as well as at least 100 total hours affected with migraine per month. After obtaining informed consent and under aseptic technique, a total of 155 units of botulinum toxin type A will be injected in the following muscles: Procerus 5 units,  5 units bilaterally, frontalis 20 units, temporalis 20 units bilaterally, occipitalis 15 units, upper cervical paraspinals 10 units bilaterally and trapezius 15 units bilaterally. The patient will receive a total of 155 units in 31 sites. Frequency of treatment is every 12 weeks unless no response to the treatments, at which time we will discontinue the injections.    PLAN:  - Seek approval for Botox for chronic migraine   - For acute migraines - ubrelvy 100 mg   - For nausea -  zofran 8 mg odt    - RTC in 1 month to start Botox      Orders Placed This Encounter    Prior authorization Order    ondansetron (ZOFRAN-ODT) 8 MG TbDL    ubrogepant (UBRELVY) 100 mg tablet     40 minutes of total time spent on the encounter, which includes 21 minutes face to face time and 19 minutes non face to face time preparing to see the patient (eg, review of tests), obtaining and/or reviewing separately obtained history, documenting clinical information in the electronic or other health record, independently interpreting results (not separately reported) and communicating results to the patient/family/caregiver or care coordination (not separately reported).     Questions and concerns were sought and answered to the patient's stated verbal satisfaction.  The patient verbalizes understanding and agreement with the above stated treatment plan.     Visit today included increased complexity associated with the care of the episodic problem migraine without aura addressed and managing the longitudinal care of the patient due to the serious and/or complex managed problem(s).   Plan for patient to return to clinic in 1 months for follow-up visit.     CC: Bre Castillo MD Elizabeth Vulevich, FNP-C  [facility]  [phone]    Dr. Lee was available during today's encounter.

## 2024-04-19 ENCOUNTER — OFFICE VISIT (OUTPATIENT)
Dept: FAMILY MEDICINE | Facility: CLINIC | Age: 19
End: 2024-04-19
Payer: COMMERCIAL

## 2024-04-19 VITALS
BODY MASS INDEX: 43.4 KG/M2 | WEIGHT: 293 LBS | DIASTOLIC BLOOD PRESSURE: 68 MMHG | OXYGEN SATURATION: 98 % | HEART RATE: 101 BPM | TEMPERATURE: 99 F | SYSTOLIC BLOOD PRESSURE: 118 MMHG | HEIGHT: 69 IN

## 2024-04-19 DIAGNOSIS — J45.20 MILD INTERMITTENT ASTHMA WITHOUT COMPLICATION: ICD-10-CM

## 2024-04-19 DIAGNOSIS — J02.9 SORE THROAT: ICD-10-CM

## 2024-04-19 DIAGNOSIS — J02.9 ACUTE VIRAL PHARYNGITIS: Primary | ICD-10-CM

## 2024-04-19 PROCEDURE — 1159F MED LIST DOCD IN RCRD: CPT | Mod: CPTII,S$GLB,, | Performed by: FAMILY MEDICINE

## 2024-04-19 PROCEDURE — 3078F DIAST BP <80 MM HG: CPT | Mod: CPTII,S$GLB,, | Performed by: FAMILY MEDICINE

## 2024-04-19 PROCEDURE — 3074F SYST BP LT 130 MM HG: CPT | Mod: CPTII,S$GLB,, | Performed by: FAMILY MEDICINE

## 2024-04-19 PROCEDURE — 99999 PR PBB SHADOW E&M-EST. PATIENT-LVL IV: CPT | Mod: PBBFAC,,, | Performed by: FAMILY MEDICINE

## 2024-04-19 PROCEDURE — 99214 OFFICE O/P EST MOD 30 MIN: CPT | Mod: S$GLB,,, | Performed by: FAMILY MEDICINE

## 2024-04-19 PROCEDURE — 3008F BODY MASS INDEX DOCD: CPT | Mod: CPTII,S$GLB,, | Performed by: FAMILY MEDICINE

## 2024-04-19 NOTE — PROGRESS NOTES
(Portions of this note were dictated using voice recognition software and may contain dictation related errors in spelling/grammar/syntax not found on text review)    CC:   Chief Complaint   Patient presents with    Sore Throat       HPI: 18 y.o. female, pt fo Dr cantu, presented for evaluation of sore throat as same day urgent care visit. She has medical history significant for mild intermittent asthma, seasonal allergic rhinitis, eczema.    Patient reports that she started having symptoms on Tuesday afternoon, she developed sore throat, symptoms have progressively worsened, today she reports having pain with swallowing, pain is located in the throat and back of the tongue, she denies having associated fever, chills, generalized body aches.  She has no sinus/allergy symptoms, no rhinorrhea, sinus pressure, ear symptoms.  She has been taking DayQuil and NyQuil without any relief.    She has asthma and was having wheezes along with slight cough last week which is better with albuterol inhaler.    She has no sick contacts.    She denies having any other symptoms or concerns    Past Medical History:   Diagnosis Date    Allergy     Asthma     Cough     Eczema     Headache(784.0)     Obesity     Rhinitis     seasonal    Snoring        Past Surgical History:   Procedure Laterality Date    ADENOIDECTOMY      TONSILLECTOMY      TYMPANOSTOMY TUBE PLACEMENT         Family History   Problem Relation Name Age of Onset    Birth defects Mother Trupti         Myelomeningocele    Hypertension Mother Trupti     Arthritis Mother Trupti     Miscarriages / Stillbirths Mother Trupti     Allergies Mother Trupti     Rhinitis Mother Trupti     Urticaria Mother Trupti     Cancer Mother Trupti         Bilateral paillary thyroud cancer stage 1    Depression Mother Trupti         ADD, anxiety disorder    Stroke Mother Trupti         Ischemic stroke    Arrhythmia Mother Trupti     Hypertension Father Sam Kirby     Tourette syndrome Father  Sam Picadash     Allergies Father Sam Picadash     Rhinitis Father Sam Picadash     Melanoma Father Sam Picadash     Vision loss Sister Eric         Optic nerve hypoplasia    Allergies Brother      Rhinitis Brother      Depression Brother Ifeanyi         ADD and Anxiety    Cancer Maternal Grandmother Cate         Cervical cancer and esophagus cancer stage four    Hypertension Maternal Grandmother Cate     Hypertension Paternal Grandmother Pamela     Cancer Paternal Grandfather Francois         anaplastic thyroid cancer    Alcohol abuse Maternal Aunt      Learning disabilities Maternal Aunt      Alcohol abuse Maternal Aunt Tressa     Depression Maternal Aunt Tressa         Substance abuse and depression    Drug abuse Maternal Aunt Tressa         Heroin    Alcohol abuse Paternal Uncle Stephan Picadash     Learning disabilities Paternal Uncle Stephan Picadash     Depression Paternal Uncle Stephan Picadash         alcoholic, drug user, manic depression, personality disorder    Drug abuse Paternal Uncle Stephan Picadash         Pills    Blindness Cousin      Amblyopia Neg Hx      Cataracts Neg Hx      Diabetes Neg Hx      Glaucoma Neg Hx      Retinal detachment Neg Hx      Strabismus Neg Hx      Angioedema Neg Hx      Asthma Neg Hx      Eczema Neg Hx      Immunodeficiency Neg Hx      Breast cancer Neg Hx      Colon cancer Neg Hx      Ovarian cancer Neg Hx         Social History     Tobacco Use    Smoking status: Never    Smokeless tobacco: Never    Tobacco comments:     MGM smokes   Substance Use Topics    Alcohol use: Never    Drug use: Never       Lab Results   Component Value Date    WBC 9.13 10/14/2022    HGB 12.1 10/14/2022    HCT 38.3 10/14/2022    MCV 86 10/14/2022     10/14/2022    CHOL 195 10/14/2022    TRIG 129 10/14/2022    HDL 40 10/14/2022    ALT 13 10/14/2022    AST 19 10/14/2022    BILITOT 0.4 10/14/2022    ALKPHOS 46 (L) 10/14/2022     10/14/2022    K 3.8 10/14/2022     10/14/2022    CREATININE  0.7 10/14/2022    ESTGFRAFRICA SEE COMMENT 12/04/2018    EGFRNONAA SEE COMMENT 12/04/2018    CALCIUM 9.4 10/14/2022    ALBUMIN 3.5 10/14/2022    BUN 12 10/14/2022    CO2 23 10/14/2022    TSH 0.946 03/08/2018    HGBA1C 5.1 10/14/2022    LDLCALC 129.2 10/14/2022    GLU 72 10/14/2022             Vital signs reviewed  PE:   APPEARANCE: Well nourished, well developed, in no acute distress.    HEAD: Normocephalic, atraumatic.  EYES: EOMI.  Conjunctivae noninjected.  EAR:  Normal EAC and TM  NOSE: Mucosa pink. Airway clear.  MOUTH & THROAT: No tonsillar enlargement. No pharyngeal erythema or exudate.   NECK: Supple with no cervical lymphadenopathy.    CHEST: Good inspiratory effort. Lungs clear to auscultation with no wheezes or crackles.  CARDIOVASCULAR: Normal S1, S2. No rubs, murmurs, or gallops.  ABDOMEN: Bowel sounds normal. Not distended. Soft. No tenderness or masses. No organomegaly.  EXTREMITIES: No edema, cyanosis, or clubbing.    Review of Systems   Constitutional:  Negative for chills, fatigue and fever.   HENT:  Positive for sore throat and trouble swallowing. Negative for nasal congestion, ear discharge, ear pain, postnasal drip, rhinorrhea, sinus pressure/congestion and sneezing.    Cardiovascular: Negative.    Gastrointestinal: Negative.    Genitourinary: Negative.    Musculoskeletal: Negative.    Neurological: Negative.    Psychiatric/Behavioral: Negative.     All other systems reviewed and are negative.      IMPRESSION  1. Acute viral pharyngitis    2. Sore throat    3. Mild intermittent asthma without complication            PLAN      1. Sore throat    - POCT Strep A, Molecular      2. Acute viral pharyngitis    Strep negative    Advised to take Tylenol/ibuprofen as needed    Encouraged to use mouthwash gargles 2-3 times daily, can try warm salt water gargles    Maintain enough hydration        3. Mild intermittent asthma without complication     Stable   Continue albuterol        Age/demographic  appropriate health maintenance:    Health Maintenance Due   Topic Date Due    Pneumococcal Vaccines (Age 0-64) (2 of 2 - PCV) 11/20/2019    Chlamydia Screening  08/06/2020    Influenza Vaccine (1) 09/01/2023    COVID-19 Vaccine (4 - 2023-24 season) 09/01/2023           Spent adequate time in obtaining history and explaining differentials     30 minutes spent during this visit of which greater than 50% devoted to face-face counseling and coordination of care regarding diagnosis and management plan       Reddy Arana   4/19/2024

## 2024-04-24 DIAGNOSIS — N92.6 IRREGULAR MENSES: ICD-10-CM

## 2024-04-24 RX ORDER — NORGESTIMATE AND ETHINYL ESTRADIOL 0.25-0.035
1 KIT ORAL
Qty: 84 TABLET | Refills: 0 | Status: SHIPPED | OUTPATIENT
Start: 2024-04-24

## 2024-04-24 NOTE — TELEPHONE ENCOUNTER
Refill Routing Note   Medication(s) are not appropriate for processing by [facility] for the following reason(s):        Patient not seen by provider within 15 months    [facility] action(s):  Defer        Medication Therapy Plan: FOVS with the PA-C      Appointments  past 12m or future 3m with PCP    Date Provider   Last Visit   1/25/2021 Ashlee Regan MD   Next Visit   Visit date not found Ashlee Regan MD   ED visits in past 90 days: 0        Note composed:9:48 AM 04/24/2024

## 2024-05-13 ENCOUNTER — PATIENT MESSAGE (OUTPATIENT)
Dept: PRIMARY CARE CLINIC | Facility: CLINIC | Age: 19
End: 2024-05-13
Payer: COMMERCIAL

## 2024-05-16 ENCOUNTER — PROCEDURE VISIT (OUTPATIENT)
Dept: NEUROLOGY | Facility: CLINIC | Age: 19
End: 2024-05-16
Payer: COMMERCIAL

## 2024-05-16 VITALS
SYSTOLIC BLOOD PRESSURE: 147 MMHG | BODY MASS INDEX: 46.88 KG/M2 | WEIGHT: 293 LBS | DIASTOLIC BLOOD PRESSURE: 87 MMHG | HEART RATE: 101 BPM

## 2024-05-16 DIAGNOSIS — G43.019 INTRACTABLE MIGRAINE WITHOUT AURA AND WITHOUT STATUS MIGRAINOSUS: ICD-10-CM

## 2024-05-16 DIAGNOSIS — G43.719 CHRONIC MIGRAINE WITHOUT AURA, INTRACTABLE, WITHOUT STATUS MIGRAINOSUS: Primary | ICD-10-CM

## 2024-05-16 PROCEDURE — 64615 CHEMODENERV MUSC MIGRAINE: CPT | Mod: S$GLB,,, | Performed by: NURSE PRACTITIONER

## 2024-05-16 PROCEDURE — 99499 UNLISTED E&M SERVICE: CPT | Mod: S$GLB,,, | Performed by: NURSE PRACTITIONER

## 2024-05-16 NOTE — PROCEDURES
SUBJECTIVE:  Patient ID: Kalyn Kirby  Chief Complaint: Follow-up and Botulinum Toxin Injection    History of Present Illness:  Kalyn Kirby is a 18 y.o. female who presents to clinic alone for follow-up of headaches and Botox injections.     05/16/2024- Interval History:  Risks, Benefits, and potential side effects of Botox discussed with patient.  Alternative treatments offered.  After thorough discussed regarding the procedure, patient has decided to move forward with initiating Botox injections for Chronic Migraine.  Patient understands we will complete 3 rounds of injections, if after 3 rounds, we do not see a 50% reduction in headaches, we will discontinue Botox injections.   She continues to experience daily headaches with full blown migraines at least 15 days per month.    Otherwise, information below is still accurate and current.     History of Present Illness:   18 y.o. female with chronic migraine, ADD, obesity, who presents to clinic with her mother for evaluation of headaches.  Previous patient of Dr. Chavez, she is a new patient to me.  She has continued to experience near daily headaches with migraines at least 15 days per month.   She has tried nurtec and ubrelvy, both of which she feels helps, feels ubrelvy is more effective than nurtec.  She has taken multiple preventive options over the years including topiramate, amitriptyline.  Was previously recommended to proceed with Botox, did not proceed with botox in the past, is ready to begin therapy now.      Notes from Dr. Chavez:  Chief Complaint: Headache     The patient is a 16 y/o female with long history of migraine, previously seen by a child neurologist last in 2017 who is retired now. She reports having been treated with a medication that also works for anxiety. She used topamax which initially helped but her headaches have started to increase in frequency. She has also tried Ubrelvy and Rizatriptan (both of which were  supplied by the patient's mom). Ubrelvy and rizatriptan lowers the intensity of the headache.     Headaches are bifrontal, pressure like, throbbing. No particular time of the day, lasting hours to days.      In the past 30 days, 2 headache-FREE days, 28 HA days (mostly moderate 5-7/10, 4-5 instances of severe 8-9/10 headaches). They've become daily in the past month and prior to that she had 4-5      Strong maternal history of migraines, with multiple siblings living with migraines.        Headache History:  Headache questionnaire     Headache Impact Test (HIT-6): 55     1. When did your Headaches start?               Approximately 2013 (age of 8)     2. Where are your headaches located?              Mostly frontal, bilaterally     3. Your headache's characteristics:              Pressure, Like a tight band, Throbbing/Pounding, Sharp     4. How long does the headache last?              Hours-Days      5. How often does the headache occur?              daily     6. Are your headaches preceded or accompanied by other symptoms? yes              If yes, please describe.  Nausea, light sensitivity        7. Does the headache awaken you at night? yes              If so, how often? Once a week     8. Please ehsan the word that best describes your headache's intensity:               moderate     9. Using a scale of 1 through 10, with 0 = no pain and 10 = the worst pain:              What score is your headache now? 6              What score is your headache at its worst? 8              What score is your headache at its best? 3           10. Possible associated headache symptoms:  [x]  Sensitivity to light              [x] Dizziness                [] Nasal or sinus pressure/ pain          [x] Sensitivity to noise             [] Vertigo                    [x] Problems with concentration  [] Sensitivity to smells           [x] Ringing in ears       [] Problems with memory                    [x] Blurred vision                      [] Irritability                 [] Problems with task completion   [] Double vision                     [] Anger                      [x]  Problems with relaxation  [x] Loss of appetite                  [] Anxiety                   [x] Neck tightness, Neck pain  [x] Nausea                               [x] Nasal congestion  [] Vomiting                                     11. Headache improving factors:  [x] Sleep                      [] Heat  [x] Darkness                [x] Ice  [] Local pressure       [] Menses (period)  [] Massage                 [] Medications:          12. Headache worsening factors:   [] Fatigue       [x] Sneezing                [x] Changes in Weather  [x] Light           [] Bending Over         [] Stress  [x] Noise          [] Ovulation                [] Multiple Sclerosis Flare-Up  [] Smells        [] Menses                   [] Food   [] Coughing    [] Alcohol        13. Number of caffeinated drinks per day: 1 every other day        14. Number of diet drinks per day:  1 every other week        15. Have you seen any other [facility] within the last 3 years?  no     Please Check any Medications or Procedures tried/failed for Headache     AED Neuromodulators   MAOIs   Ergot Alkaloids     Acetazolamide (Diamox) []  Phenelzine (Nardil) []  Dihydroergotamine (Migranal) []    Carbamazepine (Tegretol) []  Tranylcypromine (Parnate) []  Ergotamine (Ergomar) []    Gabapentin (Neurontin) []  Antihistamine/Serotonergic   Triptans     Lacosamide (Vimpat) []  Cyproheptadine (Periactin) []  Almotriptan (Axert) []    Lamotrigine (Lamictal) []  Antihypertensives   Eletriptan (Relpax) []    Levatiracetam (Keppra) []  Atenolol (Tenormin) []  Frovatriptan (Frova) []    Oxcarbazepine (Trileptal) []  Bisoprostol (Zebeta) []  Naratriptan (Amerge) []    Phenobarbital []  Candesartan (Atacand) []  Rizatriptan (Maxalt) [x]        Nebivolol (Bystolic)   Sumatriptan (Imitrex) [x]    Levetiracetam (Keppra)    Cardeilol (Coreg) []  Zolmitriptan (Zomig) []    Phenytoin (Dilantin) []  Diltiazem (Cardizem) []        Pregabalin (Lyrica) []  Lisinopril (Prinivil, Zestril) []  Combo Abortives     Primidone (Mysoline) []  Metoprolol (Toprol) []  BC Powder []    Tiagabine (Gabatril) []  Nadolol (Corgard) []  Butalbital and Acetaminophen (Bupap) []    Topiramate (Topamax)  (Trokendi) [x]  Nicardipine (Cardene) []      Vigabatrin (Sabril) []  Nimodipine (Nimotop) []  Butalbital, Acetaminophen, and caffeine (Fioricet) []    Valproic Acid (Depakote) (Divalproex Sodium) []  Propranolol (Inderal) []      Zonisamide (Zonegran) []  Telmisartan (Micardis) []  Butalbital, Aspirin, and caffeine (Fiorinal) []    Benzodiazepines   Timolol (Blocadren) []      Alprazolam (Xanax) [x]  Verapamil (Calan, Verelan) []  Butalbital, Caffeine, Acetaminophen, and Codeine (Fioricet with Codeine) []    Diazepam (Valium) []  NSAIDs       Lorazepam (Ativan) []  Acetaminophen (Tylenol) [x]      Clonazepam (Klonopin) []  Acetylsalicylic Acid (Aspirin) []  Butalbital, Caffeine, Aspirin, and Codeine  (Fiorinal with Codeine) []    Antidepressants   Diclofenac (Cambia) []      Amitriptyline (Elavil) [x]  Ibuprofen (Motrin) [x]      Desipramine (Norpramin) []  Indomethacin (Indocin) []  Aspirin, Caffeine, and Acetaminophen (Excedrin) (Goodys) []    Doxepin (Sinequan) []  Ketoprofen (Orudis) []      Fluoxetine (Prozac) []  Ketorolac (Toradol) [x]  Acetaminophen, Dichloralphenazone, and Isometheptene (Midrin) []    Imipramine (Tofranil) []  Naproxen (Anaprox) (Aleve) [x]      Nortriptyline (Pamelor) []  Meclofenamic Acid (Meclomen) []      Venlafaxine (Effexor) []  Meloxicam (Mobic) []  Procedures     Desvenlafazine (Pristiq) []  Monoclonals   Greater occipital nerve block []    Duloxetine (Cymbalta) []  Eptinezumab []  Cervical, Thoracic, Lumbar radiofrequency ablation []    Trazadone []  Erenumab-aooe (Aimovig) []  Spenopalatine ganglion block []    Wellbutrin  [x]  Galcanezumab (Emgality) []  Occipital neuro stimulation []    Protriptyline (Vivactil) []  Fremanazumab-vfrm (Ajovy)   Cervical, Thoracic, Lumbar, Caudal Epidural steroid injection []    Escitalopram (Lexapro) []  Other []  Sacroiliac joint steroid injection []    Celexa []  Memantine (Namenda) []  Transforaminal epidural steroid injection []    Ubrelvy []  Botox []  Facet joint injections []        Baclofen (Lioresal) []  Cervical, Thoracic, Lumbar medial branch blocks []            Cefaly []            Gamma Core []            Iovera []            Transcranial Magnetic stimulation []                                     Current Medications:    albuterol 90 mcg/actuation inhaler, Inhale 2 puffs into the lungs every 4 (four) hours as needed for Wheezing., Disp: 1 Inhaler, Rfl: 3    clindamycin (CLEOCIN T) 1 % external solution, Apply twice a day to bumps on scalp, Disp: 60 mL, Rfl: 4    clobetasol 0.05% (TEMOVATE) 0.05 % Oint, Apply twice a day as needed to bumps on body from bug bites, Disp: 45 g, Rfl: 3    clotrimazole-betamethasone (LOTRISONE) lotion, Apply topically 2 (two) times daily., Disp: 30 mL, Rfl: 0    ESTARYLLA 0.25-35 mg-mcg per tablet, TAKE 1 TABLET BY MOUTH EVERY DAY, Disp: 84 tablet, Rfl: 0    inhalation spacing device, Use as directed for inhalation., Disp: 1 Device, Rfl: 0    ipratropium (ATROVENT) 42 mcg (0.06 %) nasal spray, 2 sprays by Each Nostril route 3 (three) times daily., Disp: 30 mL, Rfl: 0    mupirocin (BACTROBAN) 2 % ointment, Apply topically 3 (three) times daily., Disp: 1 Tube, Rfl: 0    omeprazole (PRILOSEC) 40 MG capsule, Take 1 capsule (40 mg total) by mouth every morning., Disp: 90 capsule, Rfl: 2    ondansetron (ZOFRAN-ODT) 8 MG TbDL, Dissolve 1 tablet (8 mg total) on the tongue every 8 (eight) hours as needed (nausea)., Disp: 30 tablet, Rfl: 2    triamcinolone acetonide 0.1% (KENALOG) 0.1 % cream, Apply topically 2 (two) times daily., Disp: 80 g, Rfl: 3    triamcinolone  acetonide 0.1% (KENALOG) 0.1 % ointment, Apply topically 2 (two) times daily., Disp: 30 g, Rfl: 1    ubrogepant (UBRELVY) 100 mg tablet, Take 1 tablet (100 mg total) by mouth every 2 (two) hours as needed for Migraine. If symptoms persist or return, may repeat dose after 2 hours. Maximum: 200 mg per 24 hours, Disp: 16 tablet, Rfl: 5    azelastine (ASTELIN) 137 mcg (0.1 %) nasal spray, 1 spray (137 mcg total) by Nasal route 2 (two) times daily., Disp: 30 mL, Rfl: 0    Current Facility-Administered Medications:     onabotulinumtoxina injection 200 Units, 200 Units, Intramuscular, q12 weeks,     Review of Systems - A review of 10+ systems was conducted with pertinent positive and negative findings documented in HPI with all other systems reviewed and negative.    PFSH: Past medical, family, and social history reviewed as documented in chart with pertinent positive medical, family, and social history detailed in HPI.    OBJECTIVE:  Vitals:  BP (!) 147/87 (BP Location: Right forearm, Patient Position: Sitting)   Pulse 101   Wt (!) 144 kg (317 lb 7.4 oz)   BMI 46.88 kg/m²     Physical Exam:  Constitutional: she appears well-developed and well-nourished. she is well groomed. NAD     Review of Data:   Notes from family medicine reviewed.   Labs:  No visits with results within 6 Month(s) from this visit.   Latest known visit with results is:   Office Visit on 04/08/2023   Component Date Value Ref Range Status    POC Preg Test, Ur 04/08/2023 Negative  Negative Final     Acceptable 04/08/2023 Yes   Final    POC Blood, Urine 04/08/2023 Negative  Negative Final    POC Bilirubin, Urine 04/08/2023 Positive (A)  Negative Final    POC Urobilinogen, Urine 04/08/2023 abnormal  0.1 - 1.1 Final    POC Ketones, Urine 04/08/2023 Negative  Negative Final    POC Protein, Urine 04/08/2023 Positive (A)  Negative Final    POC Nitrates, Urine 04/08/2023 Negative  Negative Final    POC Glucose, Urine 04/08/2023 Negative   Negative Final    pH, UA 04/08/2023 6.0  5 - 8 Final    POC Specific Gravity, Urine 04/08/2023 1.025  1.003 - 1.029 Final    POC Leukocytes, Urine 04/08/2023 Negative  Negative Final    Urine Culture, Routine 04/08/2023 Multiple organisms isolated. None in predominance.  Repeat if   Final    Urine Culture, Routine 04/08/2023 clinically necessary.   Final     Imaging:  Results for orders placed or performed during the hospital encounter of 08/09/17   MRI Brain Without Contrast    Narrative    MRI brain without contrast    08/09/17 14:44:31    Accession# [id number]    CLINICAL INDICATION: 12 year old F with headache and visual disturbance.    TECHNIQUE: Multiplanar multisequence MR imaging of the brain was performed without the use of intravenous contrast.    COMPARISON: No priors.    FINDINGS:    The ventricles are normal in size for age, without evidence of hydrocephalus.    The brain appears within normal limits. No parenchymal mass, hemorrhage, edema or infarction.    No extra-axial blood or fluid collections.    The T2 skull base flow voids are preserved. Bone marrow signal intensity is unremarkable.    Impression    No evidence of acute intracranial pathology. ______________________________________     Electronically signed by resident: GINNA LONGORIA MD  Date:     08/09/17  Time:    16:06            As the supervising and teaching physician, I personally reviewed the images and resident's interpretation and I agree with the findings.          Electronically signed by: MARCUS MIRZA MD  Date:     08/09/17  Time:    16:28        Note: I have independently reviewed any/all imaging/labs/tests and agree with the report (s) as documented.  Any discrepancies will be as noted/demarcated by free text.  HIMANSHU LUCAS 5/16/2024    ASSESSMENT:  1. Chronic migraine without aura, intractable, without status migrainosus    2. Intractable migraine without aura and without status migrainosus      PLAN:  - Botox administered in clinic  for Chronic Migraine (see below)   - For acute migraines - ubrelvy 100 mg   - For nausea - zofran 8 mg odt   - RTC in 12 weeks for repeat Botox injections or sooner if needed          All questions and concerns addressed.  Patient verbalizes understanding and is agreeable with the above stated treatment plan.      PROCEDURE NOTE:  BOTOX was performed as an indicated therapy for intractable chronic migraine headaches given that the patient failed more than 2 headache medications    A time out was conducted just before the start of the procedure to verify the correct patient and procedure, procedure location, and all relevant critical information.     PROCEDURE PERFORMED: Botulinum toxin injection (40588)  CLINICAL INDICATION: G43.719  After risks and benefits were explained including bleeding, infection, worsening of pain, damage to the areas being injected, weakness of muscles, loss of muscle control, dysphagia if injecting the head or neck, facial droop if injecting the facial area, painful injection, allergic or other reaction to the medications being injected, and the failure of the procedure to help the problem, a signed consent was obtained.   The patient was placed in a comfortable area and the sites to be treated were identified.The area to be treated was prepped three times with alcohol and the alcohol allowed to dry. Next, a 30 gauge needle was used to inject the medication in the area to be treated.      Total Botox used: 155 Units   Unavoidable waste: 45 Units     Injection sites:    muscle bilaterally ( a total of 10 units divided into 2 sites)   Procerus muscle (5 units)   Frontalis muscle bilaterally (a total of 20 units divided into 4 sites)   Temporalis muscle bilaterally (a total of 40 units divided into 8 sites)   Occipitalis muscle bilaterally (a total of 30 units divided into 6 sites)   Cervical paraspinal muscles (a total of 20 units divided into 4 sites)   Trapezius muscle bilaterally  (a total of 30 units divided into 6 sites)   Complications: none   RTC for the next Botox injection: 12 weeks     CC: Bre Castillo MD Elizabeth C Vulevich, FNP-C  [facility]   [phone]

## 2024-07-09 ENCOUNTER — OFFICE VISIT (OUTPATIENT)
Dept: OPTOMETRY | Facility: CLINIC | Age: 19
End: 2024-07-09
Payer: COMMERCIAL

## 2024-07-09 ENCOUNTER — PATIENT MESSAGE (OUTPATIENT)
Dept: OPTOMETRY | Facility: CLINIC | Age: 19
End: 2024-07-09

## 2024-07-09 DIAGNOSIS — Z46.0 FITTING AND ADJUSTMENT OF SPECTACLES AND CONTACT LENSES: ICD-10-CM

## 2024-07-09 DIAGNOSIS — Z46.0 FITTING AND ADJUSTMENT OF SPECTACLES AND CONTACT LENSES: Primary | ICD-10-CM

## 2024-07-09 DIAGNOSIS — Z97.3 WEARS CONTACT LENSES: ICD-10-CM

## 2024-07-09 DIAGNOSIS — H02.402 DROOPING EYELID, LEFT: ICD-10-CM

## 2024-07-09 DIAGNOSIS — H52.13 MYOPIA, BILATERAL: Primary | ICD-10-CM

## 2024-07-09 PROCEDURE — 92014 COMPRE OPH EXAM EST PT 1/>: CPT | Mod: S$GLB,,, | Performed by: OPTOMETRIST

## 2024-07-09 PROCEDURE — 92310 CONTACT LENS FITTING OU: CPT | Mod: CSM,S$GLB,, | Performed by: OPTOMETRIST

## 2024-07-09 PROCEDURE — 99999 PR PBB SHADOW E&M-EST. PATIENT-LVL III: CPT | Mod: PBBFAC,,, | Performed by: OPTOMETRIST

## 2024-07-09 PROCEDURE — 92015 DETERMINE REFRACTIVE STATE: CPT | Mod: S$GLB,,, | Performed by: OPTOMETRIST

## 2024-07-09 PROCEDURE — 99999 PR PBB SHADOW E&M-EST. PATIENT-LVL II: CPT | Mod: PBBFAC,,, | Performed by: OPTOMETRIST

## 2024-07-09 RX ORDER — OXYMETAZOLINE HYDROCHLORIDE OPHTHALMIC 1 MG/ML
1 SOLUTION/ DROPS OPHTHALMIC EVERY MORNING
Qty: 30 EACH | Refills: 11 | Status: SHIPPED | OUTPATIENT
Start: 2024-07-09

## 2024-07-09 RX ORDER — OXYMETAZOLINE HYDROCHLORIDE OPHTHALMIC 1 MG/ML
1 SOLUTION/ DROPS OPHTHALMIC EVERY MORNING
Qty: 30 EACH | Refills: 11 | Status: SHIPPED | OUTPATIENT
Start: 2024-07-09 | End: 2024-07-09 | Stop reason: SDUPTHER

## 2024-07-09 NOTE — PROGRESS NOTES
CODY    DK: 3/20/2023 - Dr. Magdaleno     CC: Pt is here today for a routine eye exam. Pt states that she noticed a   decline with her distance vision since her last exam. Pt states that she   Is interested in drops to help with her drooping eyelids.     (-)Dryness  (-)Burning  (-)Itchiness  (-)Tearing  (-)Flashes  (+)Floaters   (+)Photophobia - during migraines  (-)Eye Pain      Diabetic: no    Contact Lens: yes                           Sleep in CL?: no                           Daily wear time: 8hrs                            Days worn before discarded: 1 day                            Solution: none    Eye Meds: systane QD-BID    PD: 59.5    Last edited by Manuela Thrasher MA on 7/9/2024  2:30 PM.            Assessment /Plan     For exam results, see Encounter Report.    Myopia, bilateral   Rx specs  Wears contact lenses  Fitting and adjustment of spectacles and contact lenses   Pt wishes to switch to monthly   Dispensed trials of Biofinity sphere   Remove nightly, replace monthly   Ok to order if happy with vision and comfort OU    Drooping eyelid, left  Pt wishes to try   oxymetazoline, PF, (UPNEEQ, PF,) 0.1 % Dpet; Place 1 drop into both eyes every morning.  Dispense: 30 each; Refill: 11      RTC 1 year

## 2024-07-10 ENCOUNTER — PATIENT MESSAGE (OUTPATIENT)
Dept: OPTOMETRY | Facility: CLINIC | Age: 19
End: 2024-07-10
Payer: COMMERCIAL

## 2024-07-15 DIAGNOSIS — N92.6 IRREGULAR MENSES: ICD-10-CM

## 2024-07-15 RX ORDER — NORGESTIMATE AND ETHINYL ESTRADIOL 0.25-0.035
1 KIT ORAL DAILY
Qty: 84 TABLET | Refills: 0 | Status: SHIPPED | OUTPATIENT
Start: 2024-07-15

## 2024-08-08 ENCOUNTER — PROCEDURE VISIT (OUTPATIENT)
Dept: NEUROLOGY | Facility: CLINIC | Age: 19
End: 2024-08-08
Payer: COMMERCIAL

## 2024-08-08 VITALS
WEIGHT: 293 LBS | HEART RATE: 101 BPM | BODY MASS INDEX: 46.88 KG/M2 | SYSTOLIC BLOOD PRESSURE: 131 MMHG | DIASTOLIC BLOOD PRESSURE: 85 MMHG

## 2024-08-08 DIAGNOSIS — G43.719 CHRONIC MIGRAINE WITHOUT AURA, INTRACTABLE, WITHOUT STATUS MIGRAINOSUS: Primary | ICD-10-CM

## 2024-08-08 DIAGNOSIS — G43.019 INTRACTABLE MIGRAINE WITHOUT AURA AND WITHOUT STATUS MIGRAINOSUS: ICD-10-CM

## 2024-08-21 DIAGNOSIS — K21.9 LPRD (LARYNGOPHARYNGEAL REFLUX DISEASE): ICD-10-CM

## 2024-08-22 RX ORDER — OMEPRAZOLE 40 MG/1
40 CAPSULE, DELAYED RELEASE ORAL EVERY MORNING
Qty: 90 CAPSULE | Refills: 1 | Status: SHIPPED | OUTPATIENT
Start: 2024-08-22 | End: 2025-02-18

## 2024-08-22 NOTE — TELEPHONE ENCOUNTER
No care due was identified.  [facility] Embedded Care Due Messages. Reference number: [id number].   8/22/2024 5:12:13 AM CDT

## 2024-08-22 NOTE — TELEPHONE ENCOUNTER
Refill Decision Note   Kalyn Rachellsandeep  is requesting a refill authorization.  Brief Assessment and Rationale for Refill:  Approve     Medication Therapy Plan:        Comments:     Note composed:7:50 AM 08/22/2024

## 2024-10-07 ENCOUNTER — LAB VISIT (OUTPATIENT)
Dept: LAB | Facility: HOSPITAL | Age: 19
End: 2024-10-07
Attending: STUDENT IN AN ORGANIZED HEALTH CARE EDUCATION/TRAINING PROGRAM
Payer: COMMERCIAL

## 2024-10-07 ENCOUNTER — OFFICE VISIT (OUTPATIENT)
Dept: PRIMARY CARE CLINIC | Facility: CLINIC | Age: 19
End: 2024-10-07
Payer: COMMERCIAL

## 2024-10-07 ENCOUNTER — PATIENT MESSAGE (OUTPATIENT)
Dept: PRIMARY CARE CLINIC | Facility: CLINIC | Age: 19
End: 2024-10-07

## 2024-10-07 VITALS
WEIGHT: 293 LBS | DIASTOLIC BLOOD PRESSURE: 70 MMHG | BODY MASS INDEX: 47.5 KG/M2 | SYSTOLIC BLOOD PRESSURE: 118 MMHG | OXYGEN SATURATION: 97 % | HEART RATE: 96 BPM

## 2024-10-07 DIAGNOSIS — E66.01 CLASS 3 SEVERE OBESITY WITH BODY MASS INDEX (BMI) OF 45.0 TO 49.9 IN ADULT, UNSPECIFIED OBESITY TYPE, UNSPECIFIED WHETHER SERIOUS COMORBIDITY PRESENT: ICD-10-CM

## 2024-10-07 DIAGNOSIS — Z23 INFLUENZA VACCINE NEEDED: ICD-10-CM

## 2024-10-07 DIAGNOSIS — E66.813 CLASS 3 SEVERE OBESITY WITH BODY MASS INDEX (BMI) OF 45.0 TO 49.9 IN ADULT, UNSPECIFIED OBESITY TYPE, UNSPECIFIED WHETHER SERIOUS COMORBIDITY PRESENT: ICD-10-CM

## 2024-10-07 DIAGNOSIS — Z00.00 ANNUAL PHYSICAL EXAM: ICD-10-CM

## 2024-10-07 DIAGNOSIS — Z00.00 ANNUAL PHYSICAL EXAM: Primary | ICD-10-CM

## 2024-10-07 LAB
ALBUMIN SERPL BCP-MCNC: 3.4 G/DL (ref 3.5–5.2)
ALP SERPL-CCNC: 42 U/L (ref 55–135)
ALT SERPL W/O P-5'-P-CCNC: 18 U/L (ref 10–44)
ANION GAP SERPL CALC-SCNC: 13 MMOL/L (ref 8–16)
AST SERPL-CCNC: 23 U/L (ref 10–40)
BASOPHILS # BLD AUTO: 0.05 K/UL (ref 0–0.2)
BASOPHILS NFR BLD: 0.4 % (ref 0–1.9)
BILIRUB SERPL-MCNC: 0.3 MG/DL (ref 0.1–1)
BUN SERPL-MCNC: 10 MG/DL (ref 6–20)
CALCIUM SERPL-MCNC: 10 MG/DL (ref 8.7–10.5)
CHLORIDE SERPL-SCNC: 107 MMOL/L (ref 95–110)
CHOLEST SERPL-MCNC: 181 MG/DL (ref 120–199)
CHOLEST/HDLC SERPL: 4.3 {RATIO} (ref 2–5)
CO2 SERPL-SCNC: 23 MMOL/L (ref 23–29)
CREAT SERPL-MCNC: 0.7 MG/DL (ref 0.5–1.4)
DIFFERENTIAL METHOD BLD: ABNORMAL
EOSINOPHIL # BLD AUTO: 0.2 K/UL (ref 0–0.5)
EOSINOPHIL NFR BLD: 1.5 % (ref 0–8)
ERYTHROCYTE [DISTWIDTH] IN BLOOD BY AUTOMATED COUNT: 13.3 % (ref 11.5–14.5)
EST. GFR  (NO RACE VARIABLE): >60 ML/MIN/1.73 M^2
ESTIMATED AVG GLUCOSE: 103 MG/DL (ref 68–131)
GLUCOSE SERPL-MCNC: 78 MG/DL (ref 70–110)
HBA1C MFR BLD: 5.2 % (ref 4–5.6)
HCT VFR BLD AUTO: 38.8 % (ref 37–48.5)
HDLC SERPL-MCNC: 42 MG/DL (ref 40–75)
HDLC SERPL: 23.2 % (ref 20–50)
HGB BLD-MCNC: 12 G/DL (ref 12–16)
IMM GRANULOCYTES # BLD AUTO: 0.04 K/UL (ref 0–0.04)
IMM GRANULOCYTES NFR BLD AUTO: 0.3 % (ref 0–0.5)
INSULIN COLLECTION INTERVAL: NORMAL
INSULIN SERPL-ACNC: 11.6 UU/ML
LDLC SERPL CALC-MCNC: 110.8 MG/DL (ref 63–159)
LYMPHOCYTES # BLD AUTO: 2.4 K/UL (ref 1–4.8)
LYMPHOCYTES NFR BLD: 19 % (ref 18–48)
MCH RBC QN AUTO: 26.6 PG (ref 27–31)
MCHC RBC AUTO-ENTMCNC: 30.9 G/DL (ref 32–36)
MCV RBC AUTO: 86 FL (ref 82–98)
MONOCYTES # BLD AUTO: 0.7 K/UL (ref 0.3–1)
MONOCYTES NFR BLD: 5.7 % (ref 4–15)
NEUTROPHILS # BLD AUTO: 9.2 K/UL (ref 1.8–7.7)
NEUTROPHILS NFR BLD: 73.1 % (ref 38–73)
NONHDLC SERPL-MCNC: 139 MG/DL
NRBC BLD-RTO: 0 /100 WBC
PLATELET # BLD AUTO: 424 K/UL (ref 150–450)
PMV BLD AUTO: 9.1 FL (ref 9.2–12.9)
POTASSIUM SERPL-SCNC: 4.4 MMOL/L (ref 3.5–5.1)
PROT SERPL-MCNC: 7.6 G/DL (ref 6–8.4)
RBC # BLD AUTO: 4.51 M/UL (ref 4–5.4)
SODIUM SERPL-SCNC: 143 MMOL/L (ref 136–145)
TRIGL SERPL-MCNC: 141 MG/DL (ref 30–150)
TSH SERPL DL<=0.005 MIU/L-ACNC: 0.97 UIU/ML (ref 0.4–4)
WBC # BLD AUTO: 12.52 K/UL (ref 3.9–12.7)

## 2024-10-07 PROCEDURE — 90471 IMMUNIZATION ADMIN: CPT | Mod: S$GLB,,, | Performed by: STUDENT IN AN ORGANIZED HEALTH CARE EDUCATION/TRAINING PROGRAM

## 2024-10-07 PROCEDURE — 83525 ASSAY OF INSULIN: CPT | Performed by: STUDENT IN AN ORGANIZED HEALTH CARE EDUCATION/TRAINING PROGRAM

## 2024-10-07 PROCEDURE — 3008F BODY MASS INDEX DOCD: CPT | Mod: CPTII,S$GLB,, | Performed by: STUDENT IN AN ORGANIZED HEALTH CARE EDUCATION/TRAINING PROGRAM

## 2024-10-07 PROCEDURE — 3078F DIAST BP <80 MM HG: CPT | Mod: CPTII,S$GLB,, | Performed by: STUDENT IN AN ORGANIZED HEALTH CARE EDUCATION/TRAINING PROGRAM

## 2024-10-07 PROCEDURE — 3074F SYST BP LT 130 MM HG: CPT | Mod: CPTII,S$GLB,, | Performed by: STUDENT IN AN ORGANIZED HEALTH CARE EDUCATION/TRAINING PROGRAM

## 2024-10-07 PROCEDURE — 85025 COMPLETE CBC W/AUTO DIFF WBC: CPT | Performed by: STUDENT IN AN ORGANIZED HEALTH CARE EDUCATION/TRAINING PROGRAM

## 2024-10-07 PROCEDURE — 3044F HG A1C LEVEL LT 7.0%: CPT | Mod: CPTII,S$GLB,, | Performed by: STUDENT IN AN ORGANIZED HEALTH CARE EDUCATION/TRAINING PROGRAM

## 2024-10-07 PROCEDURE — 36415 COLL VENOUS BLD VENIPUNCTURE: CPT | Performed by: STUDENT IN AN ORGANIZED HEALTH CARE EDUCATION/TRAINING PROGRAM

## 2024-10-07 PROCEDURE — 99999 PR PBB SHADOW E&M-EST. PATIENT-LVL IV: CPT | Mod: PBBFAC,,, | Performed by: STUDENT IN AN ORGANIZED HEALTH CARE EDUCATION/TRAINING PROGRAM

## 2024-10-07 PROCEDURE — 90656 IIV3 VACC NO PRSV 0.5 ML IM: CPT | Mod: S$GLB,,, | Performed by: STUDENT IN AN ORGANIZED HEALTH CARE EDUCATION/TRAINING PROGRAM

## 2024-10-07 PROCEDURE — 99214 OFFICE O/P EST MOD 30 MIN: CPT | Mod: 25,S$GLB,, | Performed by: STUDENT IN AN ORGANIZED HEALTH CARE EDUCATION/TRAINING PROGRAM

## 2024-10-07 PROCEDURE — 80061 LIPID PANEL: CPT | Performed by: STUDENT IN AN ORGANIZED HEALTH CARE EDUCATION/TRAINING PROGRAM

## 2024-10-07 PROCEDURE — 84443 ASSAY THYROID STIM HORMONE: CPT | Performed by: STUDENT IN AN ORGANIZED HEALTH CARE EDUCATION/TRAINING PROGRAM

## 2024-10-07 PROCEDURE — 83036 HEMOGLOBIN GLYCOSYLATED A1C: CPT | Performed by: STUDENT IN AN ORGANIZED HEALTH CARE EDUCATION/TRAINING PROGRAM

## 2024-10-07 PROCEDURE — 80053 COMPREHEN METABOLIC PANEL: CPT | Performed by: STUDENT IN AN ORGANIZED HEALTH CARE EDUCATION/TRAINING PROGRAM

## 2024-10-07 PROCEDURE — 99395 PREV VISIT EST AGE 18-39: CPT | Mod: 25,S$GLB,, | Performed by: STUDENT IN AN ORGANIZED HEALTH CARE EDUCATION/TRAINING PROGRAM

## 2024-10-07 NOTE — PROGRESS NOTES
SUBJECTIVE     Chief Complaint   Patient presents with    Annual Exam       HPI  Kalyn Kirby is a 19 y.o. female with medical diagnoses as listed in the medical history and problem list that presents for annual exam. Pt has been doing well since our last appointment.    Pt is not UTD on age appropriate CA screening.    Family, social, surgical Hx reviewed       Health Maintenance         Date Due Completion Date    Chlamydia Screening 08/06/2020 8/6/2019    Pap Smear Never done ---    TETANUS VACCINE 08/27/2024 8/27/2014    DTaP/Tdap/Td Vaccines (7 - Td or Tdap) 08/27/2024 8/27/2014    COVID-19 Vaccine (4 - 2024-25 season) 09/01/2024 8/12/2022    RSV Vaccine (Age 60+ and Pregnant patients) (1 - 1-dose 75+ series) 06/14/2078 ---          Separate EM concern:    Obesity: BMI 47.5 in clinic today, uptrending since last appt  Skips breakfast- no coffee/tea  Lunch: Has lunch on days she has school  Dinner: Cooked at home   Snack: Fruit/pudding/whatever is laying around  Water intake: about 40 ounces/days  Occasional diet coke     PAST MEDICAL HISTORY:  Past Medical History:   Diagnosis Date    Allergy     Asthma     Cough     Eczema     Headache(784.0)     Obesity     Rhinitis     seasonal    Snoring        PAST SURGICAL HISTORY:  Past Surgical History:   Procedure Laterality Date    ADENOIDECTOMY      TONSILLECTOMY      TYMPANOSTOMY TUBE PLACEMENT         SOCIAL HISTORY:  Social History     Socioeconomic History    Marital status: Single   Occupational History    Occupation: student   Tobacco Use    Smoking status: Never    Smokeless tobacco: Never    Tobacco comments:     MGM smokes   Substance and Sexual Activity    Alcohol use: Never    Drug use: Never    Sexual activity: Never   Social History Narrative    Kalyn lives with her parents (mom - Trupti), sister and 2 brothers.      12th grade.                               Social Drivers of Health     Financial Resource Strain: Low Risk  (7/15/2024)     Overall Financial Resource Strain (CARDIA)     Difficulty of Paying Living Expenses: Not hard at all   Food Insecurity: No Food Insecurity (7/15/2024)    Hunger Vital Sign     Worried About Running Out of Food in the Last Year: Never true     Ran Out of Food in the Last Year: Never true   Transportation Needs: No Transportation Needs (4/1/2024)    PRAPARE - Transportation     Lack of Transportation (Medical): No     Lack of Transportation (Non-Medical): No   Physical Activity: Insufficiently Active (7/15/2024)    Exercise Vital Sign     Days of Exercise per Week: 4 days     Minutes of Exercise per Session: 30 min   Stress: No Stress Concern Present (7/15/2024)    [employer] [place] [employer] - Occupational Stress Questionnaire     Feeling of Stress : Only a little   Housing Stability: Low Risk  (4/1/2024)    Housing Stability Vital Sign     Unable to Pay for Housing in the Last Year: No     Number of Places Lived in the Last Year: 1     Unstable Housing in the Last Year: No       FAMILY HISTORY:  Family History   Problem Relation Name Age of Onset    Birth defects Mother Trupti         Myelomeningocele    Hypertension Mother Trupti     Arthritis Mother Trupti     Miscarriages / Stillbirths Mother Trupti     Allergies Mother Trupti     Rhinitis Mother Trupti     Urticaria Mother Trupti     Cancer Mother Trupti         Bilateral paillary thyroud cancer stage 1    Depression Mother Trupti         ADD, anxiety disorder    Stroke Mother Trupti         Ischemic stroke    Arrhythmia Mother Trupti     Hypertension Father Sam Picadash     Tourette syndrome Father Sam Picadash     Allergies Father Sam Picadash     Rhinitis Father Sam Picadash     Melanoma Father Sam Picadash     Vision loss Sister Eric         Optic nerve hypoplasia    Allergies Brother      Rhinitis Brother      Depression Brother Ifeanyi         ADD and Anxiety    Cancer Maternal Grandmother Cate         Cervical cancer and esophagus cancer  stage four    Hypertension Maternal Grandmother Cate     Hypertension Paternal Grandmother Pamela     Cancer Paternal Grandfather Francois         anaplastic thyroid cancer    Alcohol abuse Maternal Aunt      Learning disabilities Maternal Aunt      Alcohol abuse Maternal Aunt Tressa     Depression Maternal Aunt Tressa         Substance abuse and depression    Drug abuse Maternal Aunt Tressa         Heroin    Alcohol abuse Paternal Uncle Stephan Kirby     Learning disabilities Paternal Uncle Stephan Kirby     Depression Paternal Uncle Stephan Kirby         alcoholic, drug user, manic depression, personality disorder    Drug abuse Paternal Uncle Stephan Picadasandeep         Pills    Blindness Cousin      Amblyopia Neg Hx      Cataracts Neg Hx      Diabetes Neg Hx      Glaucoma Neg Hx      Retinal detachment Neg Hx      Strabismus Neg Hx      Angioedema Neg Hx      Asthma Neg Hx      Eczema Neg Hx      Immunodeficiency Neg Hx      Breast cancer Neg Hx      Colon cancer Neg Hx      Ovarian cancer Neg Hx         ALLERGIES AND MEDICATIONS: updated and reviewed.  Review of patient's allergies indicates:   Allergen Reactions    No known drug allergies      Current Outpatient Medications   Medication Sig Dispense Refill    albuterol 90 mcg/actuation inhaler Inhale 2 puffs into the lungs every 4 (four) hours as needed for Wheezing. 1 Inhaler 3    azelastine (ASTELIN) 137 mcg (0.1 %) nasal spray 1 spray (137 mcg total) by Nasal route 2 (two) times daily. 30 mL 0    clindamycin (CLEOCIN T) 1 % external solution Apply twice a day to bumps on scalp 60 mL 4    clobetasol 0.05% (TEMOVATE) 0.05 % Oint Apply twice a day as needed to bumps on body from bug bites 45 g 3    clotrimazole-betamethasone (LOTRISONE) lotion Apply topically 2 (two) times daily. 30 mL 0    inhalation spacing device Use as directed for inhalation. 1 Device 0    ipratropium (ATROVENT) 42 mcg (0.06 %) nasal spray 2 sprays by Each Nostril route 3 (three) times daily. 30  mL 0    metFORMIN (GLUCOPHAGE) 500 MG tablet Take 1 tablet (500 mg total) by mouth 2 (two) times daily with meals. 180 tablet 3    mupirocin (BACTROBAN) 2 % ointment Apply topically 3 (three) times daily. 1 Tube 0    norgestimate-ethinyl estradioL (ESTARYLLA) 0.25-35 mg-mcg per tablet Take 1 tablet by mouth once daily. 84 tablet 0    omeprazole (PRILOSEC) 40 MG capsule Take 1 capsule (40 mg total) by mouth every morning. 90 capsule 1    ondansetron (ZOFRAN-ODT) 8 MG TbDL Dissolve 1 tablet (8 mg total) on the tongue every 8 (eight) hours as needed (nausea). 30 tablet 2    oxymetazoline, PF, (UPNEEQ, PF,) 0.1 % Dpet Place 1 drop into both eyes every morning. 30 each 11    triamcinolone acetonide 0.1% (KENALOG) 0.1 % cream Apply topically 2 (two) times daily. 80 g 3    triamcinolone acetonide 0.1% (KENALOG) 0.1 % ointment Apply topically 2 (two) times daily. 30 g 1    ubrogepant (UBRELVY) 100 mg tablet Take 1 tablet (100 mg total) by mouth every 2 (two) hours as needed for Migraine. If symptoms persist or return, may repeat dose after 2 hours. Maximum: 200 mg per 24 hours 16 tablet 5     Current Facility-Administered Medications   Medication Dose Route Frequency Provider Last Rate Last Admin    onabotulinumtoxina injection 200 Units  200 Units Intramuscular q12 weeks    200 Units at 10/29/24 1153       ROS  Review of Systems   HENT:  Negative for hearing loss.    Eyes:  Negative for discharge.   Respiratory:  Positive for wheezing.    Cardiovascular:  Negative for chest pain and palpitations.   Gastrointestinal:  Negative for blood in stool, constipation, diarrhea and vomiting.   Genitourinary:  Negative for dysuria and hematuria.   Musculoskeletal:  Positive for neck pain.   Neurological:  Positive for headaches. Negative for weakness.   Endo/Heme/Allergies:  Negative for polydipsia.           OBJECTIVE     Physical Exam  Vitals:    10/07/24 1324   BP: 118/70   Pulse: 96    Body mass index is 47.5 kg/m².  Weight: (!)  145.9 kg (321 lb 10.4 oz)         Physical Exam  HENT:      Head: Normocephalic and atraumatic.      Nose: Nose normal.      Mouth/Throat:      Mouth: Mucous membranes are moist.      Pharynx: Oropharynx is clear.   Eyes:      Extraocular Movements: Extraocular movements intact.      Conjunctiva/sclera: Conjunctivae normal.      Pupils: Pupils are equal, round, and reactive to light.   Cardiovascular:      Rate and Rhythm: Normal rate and regular rhythm.   Pulmonary:      Effort: Pulmonary effort is normal.      Breath sounds: Normal breath sounds.   Musculoskeletal:         General: No swelling. Normal range of motion.      Cervical back: Normal range of motion.      Right lower leg: No edema.      Left lower leg: No edema.   Skin:     General: Skin is warm.      Findings: No lesion or rash.   Neurological:      General: No focal deficit present.      Mental Status: She is alert and oriented to person, place, and time.      Motor: No weakness.   Psychiatric:         Mood and Affect: Mood normal.         Thought Content: Thought content normal.               ASSESSMENT     19 y.o. female with     1. Annual physical exam    2. Influenza vaccine needed    3. Class 3 severe obesity with body mass index (BMI) of 45.0 to 49.9 in adult, unspecified obesity type, unspecified whether serious comorbidity present        PLAN:     1. Annual physical exam  -     TSH; Future; Expected date: 10/07/2024  -     Lipid Panel; Future; Expected date: 10/07/2024  -     Comprehensive Metabolic Panel; Future; Expected date: 10/07/2024  -     CBC Auto Differential; Future; Expected date: 10/07/2024    2. Influenza vaccine needed  -     influenza (Flulaval, Fluzone, Fluarix) 45 mcg/0.5 mL IM vaccine (> or = 6 mo) 0.5 mL    3. Class 3 severe obesity with body mass index (BMI) of 45.0 to 49.9 in adult, unspecified obesity type, unspecified whether serious comorbidity present  -     Insulin, random; Future; Expected date: 10/07/2024  -      Hemoglobin A1C; Future; Expected date: 10/07/2024  -     metFORMIN (GLUCOPHAGE) 500 MG tablet; Take 1 tablet (500 mg total) by mouth 2 (two) times daily with meals.  Dispense: 180 tablet; Refill: 3    Weight loss counseling provided, including healthy diet rich in vegetables with lean meats and light on grains/red meat/snack foods. Also discussed staying hydrated with water and avoiding soft drinks, as well as exercising >5x/week with at least 30 minutes, including heavy walking. Patient verbalized understanding      Discussed age and gender appropriate screenings at this visit and encouraged a healthy diet low in simple carbohydrates, and increased physical activity.  Counseled on medically appropriate vaccines based on age and current health condition.  Screening test reviewed and discussed with patient.      RTC in 1 year     Bre Castillo MD

## 2024-10-08 RX ORDER — METFORMIN HYDROCHLORIDE 500 MG/1
500 TABLET ORAL 2 TIMES DAILY WITH MEALS
Qty: 180 TABLET | Refills: 3 | Status: SHIPPED | OUTPATIENT
Start: 2024-10-08 | End: 2025-10-08

## 2024-10-29 ENCOUNTER — PROCEDURE VISIT (OUTPATIENT)
Dept: NEUROLOGY | Facility: CLINIC | Age: 19
End: 2024-10-29
Payer: COMMERCIAL

## 2024-10-29 VITALS
WEIGHT: 293 LBS | DIASTOLIC BLOOD PRESSURE: 87 MMHG | BODY MASS INDEX: 47.5 KG/M2 | SYSTOLIC BLOOD PRESSURE: 132 MMHG | HEART RATE: 98 BPM

## 2024-10-29 DIAGNOSIS — G43.719 CHRONIC MIGRAINE WITHOUT AURA, INTRACTABLE, WITHOUT STATUS MIGRAINOSUS: Primary | ICD-10-CM

## 2024-10-29 PROCEDURE — 64615 CHEMODENERV MUSC MIGRAINE: CPT | Mod: S$GLB,,, | Performed by: NURSE PRACTITIONER

## 2024-11-15 ENCOUNTER — OFFICE VISIT (OUTPATIENT)
Dept: PSYCHIATRY | Facility: CLINIC | Age: 19
End: 2024-11-15
Payer: COMMERCIAL

## 2024-11-15 DIAGNOSIS — F43.22 ADJUSTMENT DISORDER WITH ANXIETY: Primary | ICD-10-CM

## 2024-11-15 PROCEDURE — 99999 PR PBB SHADOW E&M-EST. PATIENT-LVL I: CPT | Mod: PBBFAC,,, | Performed by: SOCIAL WORKER

## 2024-11-15 PROCEDURE — 90837 PSYTX W PT 60 MINUTES: CPT | Mod: S$GLB,,, | Performed by: SOCIAL WORKER

## 2024-11-15 PROCEDURE — 3044F HG A1C LEVEL LT 7.0%: CPT | Mod: CPTII,S$GLB,, | Performed by: SOCIAL WORKER

## 2024-11-18 ENCOUNTER — PATIENT MESSAGE (OUTPATIENT)
Dept: PRIMARY CARE CLINIC | Facility: CLINIC | Age: 19
End: 2024-11-18
Payer: COMMERCIAL

## 2024-11-18 DIAGNOSIS — J45.20 MILD INTERMITTENT ASTHMA WITHOUT COMPLICATION: ICD-10-CM

## 2024-11-19 RX ORDER — ALBUTEROL SULFATE 90 UG/1
2 INHALANT RESPIRATORY (INHALATION) EVERY 4 HOURS PRN
Qty: 8.5 G | Refills: 2 | Status: SHIPPED | OUTPATIENT
Start: 2024-11-19

## 2024-11-19 NOTE — TELEPHONE ENCOUNTER
Pt requesting med refill, med pended. Last prescribed by previous provider     LOV with Bre Castillo MD , 10/7/2024

## 2024-11-19 NOTE — TELEPHONE ENCOUNTER
No care due was identified.  Health [facility] Embedded Care Due Messages. Reference number: [id number].   11/19/2024 11:11:17 AM CST

## 2024-11-22 ENCOUNTER — OFFICE VISIT (OUTPATIENT)
Dept: OTOLARYNGOLOGY | Facility: CLINIC | Age: 19
End: 2024-11-22
Payer: COMMERCIAL

## 2024-11-22 VITALS — BODY MASS INDEX: 47.87 KG/M2 | WEIGHT: 293 LBS

## 2024-11-22 DIAGNOSIS — Z01.10 NORMAL EAR EXAM: Primary | ICD-10-CM
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
## 2024-11-27 NOTE — PROGRESS NOTES
Individual Psychotherapy (PhD/LCSW)    11/15/2024    Site:  [facility]         Therapeutic Intervention: Met with patient.  Outpatient - Insight oriented psychotherapy 60 min - CPT code 24932 and Outpatient - Supportive psychotherapy 60 min - CPT Code 46633    Chief complaint/reason for encounter: attention deficit, depression, anxiety, sleep, and interpersonal     Interval history and content of current session: Pt on time to scheduled individual session. Presents after hiatus- initially seen for therapy in March 2024 through [facility]. See [facility] chart for additional history. Pt notes hx of masking emotions, struggling to identify at times. Pt notes increased anxiety- hypervigilance, worries about 'what ifs.' Discussed plan to lean into emotions, challenge with self validation. Pt could benefit from CBT skills in future. Encouraged good self care around upcoming holidays. Will continue with sessions prn.     Treatment plan:  Target symptoms: distractability, anxiety , adjustment, work stress  Why chosen therapy is appropriate versus another modality: relevant to diagnosis, patient responds to this modality, evidence based practice  Outcome monitoring methods: self-report, observation  Therapeutic intervention type: insight oriented psychotherapy, behavior modifying psychotherapy, supportive psychotherapy    Risk parameters:  Patient reports no suicidal ideation  Patient reports no homicidal ideation  Patient reports no self-injurious behavior  Patient reports no violent behavior    Verbal deficits: None    Patient's response to intervention:  The patient's response to intervention is accepting.    Progress toward goals and other mental status changes:  The patient's progress toward goals is limited.    Diagnosis:     ICD-10-CM ICD-9-CM   1. Adjustment disorder with anxiety  F43.22 309.24       Plan:  individual psychotherapy    Return to clinic: as scheduled    Length of Service (minutes): 58

## 2025-01-16 ENCOUNTER — TELEPHONE (OUTPATIENT)
Facility: CLINIC | Age: 20
End: 2025-01-16
Payer: COMMERCIAL

## 2025-01-16 ENCOUNTER — PROCEDURE VISIT (OUTPATIENT)
Dept: NEUROLOGY | Facility: CLINIC | Age: 20
End: 2025-01-16
Payer: COMMERCIAL

## 2025-01-16 VITALS
WEIGHT: 293 LBS | BODY MASS INDEX: 47.86 KG/M2 | DIASTOLIC BLOOD PRESSURE: 84 MMHG | HEART RATE: 90 BPM | SYSTOLIC BLOOD PRESSURE: 135 MMHG

## 2025-01-16 DIAGNOSIS — G43.719 CHRONIC MIGRAINE WITHOUT AURA, INTRACTABLE, WITHOUT STATUS MIGRAINOSUS: Primary | ICD-10-CM

## 2025-01-16 DIAGNOSIS — G43.019 INTRACTABLE MIGRAINE WITHOUT AURA AND WITHOUT STATUS MIGRAINOSUS: ICD-10-CM

## 2025-01-16 PROCEDURE — 99214 OFFICE O/P EST MOD 30 MIN: CPT | Mod: 25,S$GLB,, | Performed by: NURSE PRACTITIONER

## 2025-01-16 PROCEDURE — 64615 CHEMODENERV MUSC MIGRAINE: CPT | Mod: S$GLB,,, | Performed by: NURSE PRACTITIONER

## 2025-01-16 NOTE — PROCEDURES
SUBJECTIVE:  Patient ID: Kalyn Kirby  Chief Complaint: Follow-up and Botulinum Toxin Injection    History of Present Illness:  Kalyn Kirby is a 19 y.o. female who presents to clinic alone for follow-up of headaches and Botox injections.     01/16/2025- Interval History:  Migraines continue to respond well to Botox injections, down to only 3 migraine days in November, that number gradually upticks as she grows closer to her next round of Botox. The Botox injections have achieved well over 50%  improvement in the patient's symptoms. Migraines have been reduced at least 7 days per month and the number of cumulative hours suffering with headaches has been reduced at least 100 total hours per month. Today she does have a headache indicating that the Botox has worn off. Frequency of treatment is every 12 weeks unless no response to the treatments, at which time we will discontinue the injections.    Otherwise, information below is still accurate and current.      05/16/2024- Interval History:  Risks, Benefits, and potential side effects of Botox discussed with patient.  Alternative treatments offered.  After thorough discussed regarding the procedure, patient has decided to move forward with initiating Botox injections for Chronic Migraine.  Patient understands we will complete 3 rounds of injections, if after 3 rounds, we do not see a 50% reduction in headaches, we will discontinue Botox injections.   She continues to experience daily headaches with full blown migraines at least 15 days per month.     Otherwise, information below is still accurate and current.      History of Present Illness:   18 y.o. female with chronic migraine, ADD, obesity, who presents to clinic with her mother for evaluation of headaches.  Previous patient of Dr. Chavez, she is a new patient to me.  She has continued to experience near daily headaches with migraines at least 15 days per month.   She has tried nurtec and  ubrelvy, both of which she feels helps, feels ubrelvy is more effective than nurtec.  She has taken multiple preventive options over the years including topiramate, amitriptyline.  Was previously recommended to proceed with Botox, did not proceed with botox in the past, is ready to begin therapy now.      Notes from Dr. Chavez:  Chief Complaint: Headache     The patient is a 16 y/o female with long history of migraine, previously seen by a child neurologist last in 2017 who is retired now. She reports having been treated with a medication that also works for anxiety. She used topamax which initially helped but her headaches have started to increase in frequency. She has also tried Ubrelvy and Rizatriptan (both of which were supplied by the patient's mom). Ubrelvy and rizatriptan lowers the intensity of the headache.     Headaches are bifrontal, pressure like, throbbing. No particular time of the day, lasting hours to days.      In the past 30 days, 2 headache-FREE days, 28 HA days (mostly moderate 5-7/10, 4-5 instances of severe 8-9/10 headaches). They've become daily in the past month and prior to that she had 4-5      Strong maternal history of migraines, with multiple siblings living with migraines.        Headache History:  Headache questionnaire     Headache Impact Test (HIT-6): 55     1. When did your Headaches start?               Approximately 2013 (age of 8)     2. Where are your headaches located?              Mostly frontal, bilaterally     3. Your headache's characteristics:              Pressure, Like a tight band, Throbbing/Pounding, Sharp     4. How long does the headache last?              Hours-Days      5. How often does the headache occur?              daily     6. Are your headaches preceded or accompanied by other symptoms? yes              If yes, please describe.  Nausea, light sensitivity        7. Does the headache awaken you at night? yes              If so, how often? Once a week     8.  Please ehsan the word that best describes your headache's intensity:               moderate     9. Using a scale of 1 through 10, with 0 = no pain and 10 = the worst pain:              What score is your headache now? 6              What score is your headache at its worst? 8              What score is your headache at its best? 3           10. Possible associated headache symptoms:  [x]  Sensitivity to light              [x] Dizziness                [] Nasal or sinus pressure/ pain          [x] Sensitivity to noise             [] Vertigo                    [x] Problems with concentration  [] Sensitivity to smells           [x] Ringing in ears       [] Problems with memory                    [x] Blurred vision                     [] Irritability                 [] Problems with task completion   [] Double vision                     [] Anger                      [x]  Problems with relaxation  [x] Loss of appetite                  [] Anxiety                   [x] Neck tightness, Neck pain  [x] Nausea                               [x] Nasal congestion  [] Vomiting                                     11. Headache improving factors:  [x] Sleep                      [] Heat  [x] Darkness                [x] Ice  [] Local pressure       [] Menses (period)  [] Massage                 [] Medications:          12. Headache worsening factors:   [] Fatigue       [x] Sneezing                [x] Changes in Weather  [x] Light           [] Bending Over         [] Stress  [x] Noise          [] Ovulation                [] Multiple Sclerosis Flare-Up  [] Smells        [] Menses                   [] Food   [] Coughing    [] Alcohol        13. Number of caffeinated drinks per day: 1 every other day        14. Number of diet drinks per day:  1 every other week        15. Have you seen any other [facility] within the last 3 years?  no    Current Medications:    albuterol (PROVENTIL/VENTOLIN HFA) 90 mcg/actuation inhaler, Inhale 2  puffs into the lungs every 4 (four) hours as needed for Wheezing., Disp: 8.5 g, Rfl: 2    clindamycin (CLEOCIN T) 1 % external solution, Apply twice a day to bumps on scalp, Disp: 60 mL, Rfl: 4    clotrimazole-betamethasone (LOTRISONE) lotion, Apply topically 2 (two) times daily., Disp: 30 mL, Rfl: 0    inhalation spacing device, Use as directed for inhalation., Disp: 1 Device, Rfl: 0    metFORMIN (GLUCOPHAGE) 500 MG tablet, Take 1 tablet (500 mg total) by mouth 2 (two) times daily with meals., Disp: 180 tablet, Rfl: 3    mupirocin (BACTROBAN) 2 % ointment, Apply topically 3 (three) times daily., Disp: 1 Tube, Rfl: 0    norgestimate-ethinyl estradioL (ESTARYLLA) 0.25-35 mg-mcg per tablet, Take 1 tablet by mouth once daily., Disp: 84 tablet, Rfl: 0    omeprazole (PRILOSEC) 40 MG capsule, Take 1 capsule (40 mg total) by mouth every morning., Disp: 90 capsule, Rfl: 1    ondansetron (ZOFRAN-ODT) 8 MG TbDL, Dissolve 1 tablet (8 mg total) on the tongue every 8 (eight) hours as needed (nausea)., Disp: 30 tablet, Rfl: 2    triamcinolone acetonide 0.1% (KENALOG) 0.1 % cream, Apply topically 2 (two) times daily., Disp: 80 g, Rfl: 3    triamcinolone acetonide 0.1% (KENALOG) 0.1 % ointment, Apply topically 2 (two) times daily., Disp: 30 g, Rfl: 1    ubrogepant (UBRELVY) 100 mg tablet, Take 1 tablet (100 mg total) by mouth every 2 (two) hours as needed for Migraine. If symptoms persist or return, may repeat dose after 2 hours. Maximum: 200 mg per 24 hours, Disp: 16 tablet, Rfl: 5    azelastine (ASTELIN) 137 mcg (0.1 %) nasal spray, 1 spray (137 mcg total) by Nasal route 2 (two) times daily., Disp: 30 mL, Rfl: 0    clobetasol 0.05% (TEMOVATE) 0.05 % Oint, Apply twice a day as needed to bumps on body from bug bites, Disp: 45 g, Rfl: 3    ipratropium (ATROVENT) 42 mcg (0.06 %) nasal spray, 2 sprays by Each Nostril route 3 (three) times daily., Disp: 30 mL, Rfl: 0    oxymetazoline, PF, (UPNEEQ, PF,) 0.1 % Dpet, Place 1 drop into  both eyes every morning., Disp: 30 each, Rfl: 11    Current Facility-Administered Medications:     onabotulinumtoxina injection 200 Units, 200 Units, Intramuscular, q12 weeks, , 200 Units at 10/29/24 1153    Review of Systems - A review of 10+ systems was conducted with pertinent positive and negative findings documented in HPI with all other systems reviewed and negative.    PFSH: Past medical, family, and social history reviewed as documented in chart with pertinent positive medical, family, and social history detailed in HPI.    OBJECTIVE:  Vitals:  /84   Pulse 90   Wt (!) 147 kg (324 lb 1.2 oz)   BMI 47.86 kg/m²     Physical Exam:  Constitutional: she appears well-developed and well-nourished. she is well groomed. NAD     Review of Data:   Notes from ENT, psych reviewed.   Labs:  Lab Visit on 10/07/2024   Component Date Value Ref Range Status    Insulin 10/07/2024 11.6  <25.0 uU/mL Final    Insulin Collection Interval 10/07/2024 n/a   Final    Hemoglobin A1C 10/07/2024 5.2  4.0 - 5.6 % Final    Estimated Avg Glucose 10/07/2024 103  68 - 131 mg/dL Final    TSH 10/07/2024 0.970  0.400 - 4.000 uIU/mL Final    Cholesterol 10/07/2024 181  120 - 199 mg/dL Final    Triglycerides 10/07/2024 141  30 - 150 mg/dL Final    HDL 10/07/2024 42  40 - 75 mg/dL Final    LDL Cholesterol 10/07/2024 110.8  63.0 - 159.0 mg/dL Final    HDL/Cholesterol Ratio 10/07/2024 23.2  20.0 - 50.0 % Final    Total Cholesterol/HDL Ratio 10/07/2024 4.3  2.0 - 5.0 Final    Non-HDL Cholesterol 10/07/2024 139  mg/dL Final    Sodium 10/07/2024 143  136 - 145 mmol/L Final    Potassium 10/07/2024 4.4  3.5 - 5.1 mmol/L Final    Chloride 10/07/2024 107  95 - 110 mmol/L Final    CO2 10/07/2024 23  23 - 29 mmol/L Final    Glucose 10/07/2024 78  70 - 110 mg/dL Final    BUN 10/07/2024 10  6 - 20 mg/dL Final    Creatinine 10/07/2024 0.7  0.5 - 1.4 mg/dL Final    Calcium 10/07/2024 10.0  8.7 - 10.5 mg/dL Final    Total Protein 10/07/2024 7.6  6.0 - 8.4  g/dL Final    Albumin 10/07/2024 3.4 (L)  3.5 - 5.2 g/dL Final    Total Bilirubin 10/07/2024 0.3  0.1 - 1.0 mg/dL Final    Alkaline Phosphatase 10/07/2024 42 (L)  55 - 135 U/L Final    AST 10/07/2024 23  10 - 40 U/L Final    ALT 10/07/2024 18  10 - 44 U/L Final    eGFR 10/07/2024 >60.0  >60 mL/min/1.73 m^2 Final    Anion Gap 10/07/2024 13  8 - 16 mmol/L Final    WBC 10/07/2024 12.52  3.90 - 12.70 K/uL Final    RBC 10/07/2024 4.51  4.00 - 5.40 M/uL Final    Hemoglobin 10/07/2024 12.0  12.0 - 16.0 g/dL Final    Hematocrit 10/07/2024 38.8  37.0 - 48.5 % Final    MCV 10/07/2024 86  82 - 98 fL Final    MCH 10/07/2024 26.6 (L)  27.0 - 31.0 pg Final    MCHC 10/07/2024 30.9 (L)  32.0 - 36.0 g/dL Final    RDW 10/07/2024 13.3  11.5 - 14.5 % Final    Platelets 10/07/2024 424  150 - 450 K/uL Final    MPV 10/07/2024 9.1 (L)  9.2 - 12.9 fL Final    Immature Granulocytes 10/07/2024 0.3  0.0 - 0.5 % Final    Gran # (ANC) 10/07/2024 9.2 (H)  1.8 - 7.7 K/uL Final    Immature Grans (Abs) 10/07/2024 0.04  0.00 - 0.04 K/uL Final    Lymph # 10/07/2024 2.4  1.0 - 4.8 K/uL Final    Mono # 10/07/2024 0.7  0.3 - 1.0 K/uL Final    Eos # 10/07/2024 0.2  0.0 - 0.5 K/uL Final    Baso # 10/07/2024 0.05  0.00 - 0.20 K/uL Final    nRBC 10/07/2024 0  0 /100 WBC Final    Gran % 10/07/2024 73.1 (H)  38.0 - 73.0 % Final    Lymph % 10/07/2024 19.0  18.0 - 48.0 % Final    Mono % 10/07/2024 5.7  4.0 - 15.0 % Final    Eosinophil % 10/07/2024 1.5  0.0 - 8.0 % Final    Basophil % 10/07/2024 0.4  0.0 - 1.9 % Final    Differential Method 10/07/2024 Automated   Final     Imaging:  Results for orders placed or performed during the hospital encounter of 08/09/17   MRI Brain Without Contrast    Narrative    MRI brain without contrast    08/09/17 14:44:31    Accession# [id number]    CLINICAL INDICATION: 12 year old F with headache and visual disturbance.    TECHNIQUE: Multiplanar multisequence MR imaging of the brain was performed without the use of intravenous  contrast.    COMPARISON: No priors.    FINDINGS:    The ventricles are normal in size for age, without evidence of hydrocephalus.    The brain appears within normal limits. No parenchymal mass, hemorrhage, edema or infarction.    No extra-axial blood or fluid collections.    The T2 skull base flow voids are preserved. Bone marrow signal intensity is unremarkable.    Impression    No evidence of acute intracranial pathology. ______________________________________     Electronically signed by resident: GINNA LONGORIA MD  Date:     08/09/17  Time:    16:06            As the supervising and teaching physician, I personally reviewed the images and resident's interpretation and I agree with the findings.          Electronically signed by: MARCUS MIRZA MD  Date:     08/09/17  Time:    16:28        Note: I have independently reviewed any/all imaging/labs/tests and agree with the report (s) as documented.  Any discrepancies will be as noted/demarcated by free text.  LANCE, THERON-C 1/16/2025    ASSESSMENT:  1. Chronic migraine without aura, intractable, without status migrainosus    2. Intractable migraine without aura and without status migrainosus      PLAN:  - Botox administered in clinic for Chronic Migraine (see below)   - For acute migraine - ubrelvy 100 mg   - For nausea - zofran 8 mg odt   - refills provided   - RTC in 12 weeks for repeat Botox injections or sooner if needed          All questions and concerns addressed.  Patient verbalizes understanding and is agreeable with the above stated treatment plan.      PROCEDURE NOTE:  BOTOX was performed as an indicated therapy for intractable chronic migraine headaches given that the patient failed more than 2 headache medications    A time out was conducted just before the start of the procedure to verify the correct patient and procedure, procedure location, and all relevant critical information.     The Botox injections have achieved well over 50%  improvement in the  patient's symptoms. Migraines have been reduced at least 7 days per month and the number of cumulative hours suffering with headaches has been reduced at least 100 total hours per month. Today she does have a headache indicating that the Botox has worn off. Frequency of treatment is every 12 weeks unless no response to the treatments, at which time we will discontinue the injections.    PROCEDURE PERFORMED: Botulinum toxin injection (81929)  CLINICAL INDICATION: G43.719  After risks and benefits were explained including bleeding, infection, worsening of pain, damage to the areas being injected, weakness of muscles, loss of muscle control, dysphagia if injecting the head or neck, facial droop if injecting the facial area, painful injection, allergic or other reaction to the medications being injected, and the failure of the procedure to help the problem, a signed consent was obtained.   The patient was placed in a comfortable area and the sites to be treated were identified.The area to be treated was prepped three times with alcohol and the alcohol allowed to dry. Next, a 30 gauge needle was used to inject the medication in the area to be treated.      Total Botox used: 155 Units   Unavoidable waste: 45 Units     Injection sites:    muscle bilaterally ( a total of 10 units divided into 2 sites)   Procerus muscle (5 units)   Frontalis muscle bilaterally (a total of 20 units divided into 4 sites)   Temporalis muscle bilaterally (a total of 40 units divided into 8 sites)   Occipitalis muscle bilaterally (a total of 30 units divided into 6 sites)   Cervical paraspinal muscles (a total of 20 units divided into 4 sites)   Trapezius muscle bilaterally (a total of 30 units divided into 6 sites)   Complications: none   RTC for the next Botox injection: 12 weeks     CC: Bre Castillo MD Elizabeth C Vulevich, FNP-C  [facility]   [phone]

## 2025-01-26 RX ORDER — UBROGEPANT 100 MG/1
100 TABLET ORAL
Qty: 16 TABLET | Refills: 2 | Status: SHIPPED | OUTPATIENT
Start: 2025-01-26

## 2025-02-21 ENCOUNTER — LAB VISIT (OUTPATIENT)
Dept: LAB | Facility: HOSPITAL | Age: 20
End: 2025-02-21
Attending: STUDENT IN AN ORGANIZED HEALTH CARE EDUCATION/TRAINING PROGRAM
Payer: COMMERCIAL

## 2025-02-21 ENCOUNTER — OFFICE VISIT (OUTPATIENT)
Dept: OBSTETRICS AND GYNECOLOGY | Facility: CLINIC | Age: 20
End: 2025-02-21
Payer: COMMERCIAL

## 2025-02-21 VITALS
WEIGHT: 293 LBS | DIASTOLIC BLOOD PRESSURE: 82 MMHG | HEIGHT: 69 IN | BODY MASS INDEX: 43.4 KG/M2 | SYSTOLIC BLOOD PRESSURE: 120 MMHG

## 2025-02-21 DIAGNOSIS — N93.9 ABNORMAL UTERINE BLEEDING (AUB): Primary | ICD-10-CM

## 2025-02-21 DIAGNOSIS — Z12.4 ENCOUNTER FOR SCREENING FOR CERVICAL CANCER: ICD-10-CM

## 2025-02-21 DIAGNOSIS — N93.9 ABNORMAL UTERINE BLEEDING (AUB): ICD-10-CM

## 2025-02-21 DIAGNOSIS — Z01.419 WELL WOMAN EXAM: ICD-10-CM

## 2025-02-21 LAB
ESTRADIOL SERPL-MCNC: 78 PG/ML
FSH SERPL-ACNC: 6.12 MIU/ML
PROLACTIN SERPL IA-MCNC: 8.8 NG/ML (ref 5.2–26.5)
TESTOST SERPL-MCNC: 60 NG/DL (ref 5–73)
TSH SERPL DL<=0.005 MIU/L-ACNC: 1.31 UIU/ML (ref 0.4–4)

## 2025-02-21 PROCEDURE — 3008F BODY MASS INDEX DOCD: CPT | Mod: CPTII,S$GLB,, | Performed by: STUDENT IN AN ORGANIZED HEALTH CARE EDUCATION/TRAINING PROGRAM

## 2025-02-21 PROCEDURE — 83498 ASY HYDROXYPROGESTERONE 17-D: CPT | Performed by: STUDENT IN AN ORGANIZED HEALTH CARE EDUCATION/TRAINING PROGRAM

## 2025-02-21 PROCEDURE — 84146 ASSAY OF PROLACTIN: CPT | Performed by: STUDENT IN AN ORGANIZED HEALTH CARE EDUCATION/TRAINING PROGRAM

## 2025-02-21 PROCEDURE — 99385 PREV VISIT NEW AGE 18-39: CPT | Mod: S$GLB,,, | Performed by: STUDENT IN AN ORGANIZED HEALTH CARE EDUCATION/TRAINING PROGRAM

## 2025-02-21 PROCEDURE — 88175 CYTOPATH C/V AUTO FLUID REDO: CPT | Performed by: STUDENT IN AN ORGANIZED HEALTH CARE EDUCATION/TRAINING PROGRAM

## 2025-02-21 PROCEDURE — 82670 ASSAY OF TOTAL ESTRADIOL: CPT | Performed by: STUDENT IN AN ORGANIZED HEALTH CARE EDUCATION/TRAINING PROGRAM

## 2025-02-21 PROCEDURE — 3074F SYST BP LT 130 MM HG: CPT | Mod: CPTII,S$GLB,, | Performed by: STUDENT IN AN ORGANIZED HEALTH CARE EDUCATION/TRAINING PROGRAM

## 2025-02-21 PROCEDURE — 3079F DIAST BP 80-89 MM HG: CPT | Mod: CPTII,S$GLB,, | Performed by: STUDENT IN AN ORGANIZED HEALTH CARE EDUCATION/TRAINING PROGRAM

## 2025-02-21 PROCEDURE — 99999 PR PBB SHADOW E&M-EST. PATIENT-LVL III: CPT | Mod: PBBFAC,,, | Performed by: STUDENT IN AN ORGANIZED HEALTH CARE EDUCATION/TRAINING PROGRAM

## 2025-02-21 PROCEDURE — 83001 ASSAY OF GONADOTROPIN (FSH): CPT | Performed by: STUDENT IN AN ORGANIZED HEALTH CARE EDUCATION/TRAINING PROGRAM

## 2025-02-21 PROCEDURE — 84402 ASSAY OF FREE TESTOSTERONE: CPT | Performed by: STUDENT IN AN ORGANIZED HEALTH CARE EDUCATION/TRAINING PROGRAM

## 2025-02-21 PROCEDURE — 84403 ASSAY OF TOTAL TESTOSTERONE: CPT | Performed by: STUDENT IN AN ORGANIZED HEALTH CARE EDUCATION/TRAINING PROGRAM

## 2025-02-21 PROCEDURE — 84443 ASSAY THYROID STIM HORMONE: CPT | Performed by: STUDENT IN AN ORGANIZED HEALTH CARE EDUCATION/TRAINING PROGRAM

## 2025-02-21 PROCEDURE — 1159F MED LIST DOCD IN RCRD: CPT | Mod: CPTII,S$GLB,, | Performed by: STUDENT IN AN ORGANIZED HEALTH CARE EDUCATION/TRAINING PROGRAM

## 2025-02-21 NOTE — PROGRESS NOTES
"   Hypertension Maternal Grandmother Cate     Hypertension Paternal Grandmother Pamela     Cancer Paternal Grandfather Francois         anaplastic thyroid cancer    Alcohol abuse Maternal Aunt      Learning disabilities Maternal Aunt      Alcohol abuse Maternal Aunt Tressa     Depression Maternal Aunt Tressa         Substance abuse and depression    Drug abuse Maternal Aunt Tressa         Heroin    Alcohol abuse Paternal Uncle Stephan Kirby     Learning disabilities Paternal Uncle Stephan Kirby     Depression Paternal Uncle Stephan Kirby         alcoholic, drug user, manic depression, personality disorder    Drug abuse Paternal Uncle Stephan Kirby         Pills    Blindness Cousin      Amblyopia Neg Hx      Cataracts Neg Hx      Diabetes Neg Hx      Glaucoma Neg Hx      Retinal detachment Neg Hx      Strabismus Neg Hx      Angioedema Neg Hx      Asthma Neg Hx      Eczema Neg Hx      Immunodeficiency Neg Hx      Breast cancer Neg Hx      Colon cancer Neg Hx      Ovarian cancer Neg Hx       Current Medications[2]    ROS:     GENERAL: Denies unintentional weight gain or weight loss. Feeling well overall.   SKIN: Denies rash or lesions.   HEENT: Denies headaches, or vision changes.   CARDIOVASCULAR: Denies palpitations or chest pain.   RESPIRATORY: Denies shortness of breath or dyspnea on exertion.  BREASTS: Denies pain, lumps, or nipple discharge.   ABDOMEN: Denies abdominal pain, constipation, diarrhea, nausea, vomiting, change in appetite.  URINARY: Denies frequency, dysuria, hematuria.  NEUROLOGIC: Denies syncope or weakness.   PSYCHIATRIC: Denies depression, anxiety or mood swings.    Physical Exam:      PHYSICAL EXAM:  /82 (BP Location: Left arm, Patient Position: Sitting)   Ht 5' 9" (1.753 m)   Wt (!) 147.8 kg (325 lb 13.4 oz)   LMP 11/18/2024   BMI 48.12 kg/m²   Body mass index is 48.12 kg/m².     APPEARANCE: Well nourished, well developed, in no acute distress.  PSYCH: Appropriate mood and " affect.  SKIN: No acne or hirsutism.  NECK: Neck symmetric without masses or thyromegaly.  NODES: No inguinal, axillary, or supraclavicular lymph node enlargement.  BREASTS: Symmetrical, no skin changes or visible lesions.  No palpable masses or nipple discharge bilaterally.  ABDOMEN: Soft.  No tenderness or masses.    PELVIC: Normal external genitalia without lesions.  Normal hair distribution.  Adequate perineal body, normal urethral meatus.  Vagina moist and well rugated without lesions or discharge.  Cervix pink, without lesions, discharge or tenderness.  No significant cystocele or rectocele.  Bimanual exam shows uterus to be normal size, regular, mobile and nontender.  Adnexa without masses or tenderness.    EXTREMITIES: No edema.  No tenderness to palpation.  Female chaperone was present for exam.     Assessment/Plan:     19 y.o.     Abnormal uterine bleeding (AUB)  -     TSH; Future; Expected date: 2025  -     Prolactin; Future; Expected date: 2025  -     Testosterone; Future; Expected date: 2025  -     Testosterone, Free; Future; Expected date: 2025  -     17-Hydroxyprogesterone; Future; Expected date: 2025  -     US Pelvis Comp with Transvag NON-OB (xpd; Future; Expected date: 2025  -     FOLLICLE STIMULATING HORMONE; Future; Expected date: 2025  -     ESTRADIOL; Future; Expected date: 2025    Encounter for screening for cervical cancer  -     Liquid-Based Pap Smear, Screening    Well woman exam          Counselin.  Annual exam performed today without difficulty.  Patient was counseled today on current ASCCP pap guidelines, the recommendation for yearly pelvic exams, healthy diet and exercise routines, breast self awareness.  Pap smear collected.  All questions answered.  Labs ordered.  Will hold off on u/s at this time but discussed if no improvement.    2.  Contraception:  declines.  3.  STD testing:  declines.  4.  Follow up with PCP for  other health maintenance.  5.  Follow up in about 4 weeks (around 3/21/2025).      Use of the [employer] Patient Portal discussed and encouraged during today's visit.                  [1]   Social History  Socioeconomic History    Marital status: Single   Occupational History    Occupation: student   Tobacco Use    Smoking status: Never    Smokeless tobacco: Never    Tobacco comments:     MOLINA smokes   Substance and Sexual Activity    Alcohol use: Never    Drug use: Never    Sexual activity: Never   Social History Narrative    Kalyn lives with her parents (mom - Trupti), sister and 2 brothers.      12th grade.                               Social Drivers of Health     Financial Resource Strain: Low Risk  (7/15/2024)    Overall Financial Resource Strain (CARDIA)     Difficulty of Paying Living Expenses: Not hard at all   Food Insecurity: No Food Insecurity (7/15/2024)    Hunger Vital Sign     Worried About Running Out of Food in the Last Year: Never true     Ran Out of Food in the Last Year: Never true   Transportation Needs: No Transportation Needs (4/1/2024)    PRAPARE - Transportation     Lack of Transportation (Medical): No     Lack of Transportation (Non-Medical): No   Physical Activity: Insufficiently Active (7/15/2024)    Exercise Vital Sign     Days of Exercise per Week: 4 days     Minutes of Exercise per Session: 30 min   Stress: No Stress Concern Present (7/15/2024)    [place] [place] [facility] [employer] [facility] - Occupational Stress Questionnaire     Feeling of Stress : Only a little   Housing Stability: Low Risk  (4/1/2024)    Housing Stability Vital Sign     Unable to Pay for Housing in the Last Year: No     Number of Places Lived in the Last Year: 1     Unstable Housing in the Last Year: No   [2]   Current Outpatient Medications:     albuterol (PROVENTIL/VENTOLIN HFA) 90 mcg/actuation inhaler, Inhale 2 puffs into the lungs every 4 (four) hours as needed for Wheezing., Disp: 8.5 g, Rfl: 2    clindamycin  (CLEOCIN T) 1 % external solution, Apply twice a day to bumps on scalp, Disp: 60 mL, Rfl: 4    clotrimazole-betamethasone (LOTRISONE) lotion, Apply topically 2 (two) times daily., Disp: 30 mL, Rfl: 0    inhalation spacing device, Use as directed for inhalation., Disp: 1 Device, Rfl: 0    metFORMIN (GLUCOPHAGE) 500 MG tablet, Take 1 tablet (500 mg total) by mouth 2 (two) times daily with meals., Disp: 180 tablet, Rfl: 3    mupirocin (BACTROBAN) 2 % ointment, Apply topically 3 (three) times daily., Disp: 1 Tube, Rfl: 0    norgestimate-ethinyl estradioL (ESTARYLLA) 0.25-35 mg-mcg per tablet, Take 1 tablet by mouth once daily., Disp: 84 tablet, Rfl: 0    ondansetron (ZOFRAN-ODT) 8 MG TbDL, Dissolve 1 tablet (8 mg total) on the tongue every 8 (eight) hours as needed (nausea)., Disp: 30 tablet, Rfl: 2    triamcinolone acetonide 0.1% (KENALOG) 0.1 % cream, Apply topically 2 (two) times daily., Disp: 80 g, Rfl: 3    triamcinolone acetonide 0.1% (KENALOG) 0.1 % ointment, Apply topically 2 (two) times daily., Disp: 30 g, Rfl: 1    ubrogepant (UBRELVY) 100 mg tablet, Take 1 tablet (100 mg total) by mouth every 2 (two) hours as needed for Migraine. If symptoms persist or return, may repeat dose after 2 hours. Maximum: 200 mg per 24 hours, Disp: 16 tablet, Rfl: 2    omeprazole (PRILOSEC) 40 MG capsule, Take 1 capsule (40 mg total) by mouth every morning., Disp: 90 capsule, Rfl: 2    Current Facility-Administered Medications:     onabotulinumtoxina injection 200 Units, 200 Units, Intramuscular, q12 weeks, , 200 Units at 01/16/25 1100

## 2025-02-25 DIAGNOSIS — K21.9 LPRD (LARYNGOPHARYNGEAL REFLUX DISEASE): ICD-10-CM

## 2025-02-25 LAB
17OHP SERPL-MCNC: 116 NG/DL (ref 35–413)
TESTOST FREE SERPL-MCNC: 1.8 PG/ML

## 2025-02-25 RX ORDER — OMEPRAZOLE 40 MG/1
40 CAPSULE, DELAYED RELEASE ORAL EVERY MORNING
Qty: 90 CAPSULE | Refills: 2 | Status: SHIPPED | OUTPATIENT
Start: 2025-02-25 | End: 2025-11-22

## 2025-02-25 NOTE — TELEPHONE ENCOUNTER
Provider Staff:  Action required for this patient    Requires labs      Please see care gap opportunities below in Care Due Message.    Thanks!  [facility]     Appointments      Date Provider   Last Visit   10/7/2024 Bre Castillo MD   Next Visit   Visit date not found Bre Castillo MD     Refill Decision Note   Kalyn Kirby  is requesting a refill authorization.  Brief Assessment and Rationale for Refill:  Approve     Medication Therapy Plan:        Comments:     Note composed:12:31 PM 02/25/2025

## 2025-02-25 NOTE — TELEPHONE ENCOUNTER
Care Due:                  Date            Visit Type   Department     Provider  --------------------------------------------------------------------------------                                MYCHART                              FOLLOWUP/OF  [facility]  Last Visit: 10-      [facility] VISIT   MARITA Castillo  Next Visit: None Scheduled  None         None Found                                                            Last  Test          Frequency    Reason                     Performed    Due Date  --------------------------------------------------------------------------------    HBA1C.......  6 months...  metFORMIN................  10-   04-    [facility] Embedded Care Due Messages. Reference number: [id number].   2/25/2025 12:29:42 PM CST

## 2025-02-28 ENCOUNTER — RESULTS FOLLOW-UP (OUTPATIENT)
Dept: OBSTETRICS AND GYNECOLOGY | Facility: HOSPITAL | Age: 20
End: 2025-02-28

## 2025-02-28 LAB
FINAL PATHOLOGIC DIAGNOSIS: NORMAL
Lab: NORMAL

## 2025-04-03 ENCOUNTER — TELEPHONE (OUTPATIENT)
Facility: CLINIC | Age: 20
End: 2025-04-03
Payer: COMMERCIAL

## 2025-04-03 ENCOUNTER — PROCEDURE VISIT (OUTPATIENT)
Dept: NEUROLOGY | Facility: CLINIC | Age: 20
End: 2025-04-03
Payer: COMMERCIAL

## 2025-04-03 VITALS — DIASTOLIC BLOOD PRESSURE: 84 MMHG | SYSTOLIC BLOOD PRESSURE: 125 MMHG | HEART RATE: 106 BPM

## 2025-04-03 DIAGNOSIS — G43.719 CHRONIC MIGRAINE WITHOUT AURA, INTRACTABLE, WITHOUT STATUS MIGRAINOSUS: Primary | ICD-10-CM

## 2025-04-03 DIAGNOSIS — G43.019 INTRACTABLE MIGRAINE WITHOUT AURA AND WITHOUT STATUS MIGRAINOSUS: ICD-10-CM

## 2025-04-03 RX ORDER — ONDANSETRON 8 MG/1
8 TABLET, ORALLY DISINTEGRATING ORAL EVERY 8 HOURS PRN
Qty: 30 TABLET | Refills: 2 | Status: SHIPPED | OUTPATIENT
Start: 2025-04-03

## 2025-04-03 RX ORDER — UBROGEPANT 100 MG/1
100 TABLET ORAL
Qty: 16 TABLET | Refills: 2 | Status: SHIPPED | OUTPATIENT
Start: 2025-04-03

## 2025-04-03 NOTE — PROCEDURES
SUBJECTIVE:  Patient ID: Kalyn Kirby  Chief Complaint: Botulinum Toxin Injection and Follow-up    History of Present Illness:  Kalyn Kirby is a 19 y.o. female who presents to clinic alone for follow-up of headaches and Botox injections.     04/03/2025- Interval History:  Migraines continue to respond well to Botox, down to no more than 4 migraines per month.  Migraines have been far less intense since starting Botox, also migraines are much shorter in duration, respond better to abortive meds.  Patient has continued to achieve a greater than 50% reduction in the frequency of their migraines with continued Botox injections.  Wishes to proceed with today's injections as scheduled.     Otherwise, information below is still accurate and current.     01/16/2025- Interval History:  Migraines continue to respond well to Botox injections, down to only 3 migraine days in November, that number gradually upticks as she grows closer to her next round of Botox. The Botox injections have achieved well over 50%  improvement in the patient's symptoms. Migraines have been reduced at least 7 days per month and the number of cumulative hours suffering with headaches has been reduced at least 100 total hours per month. Today she does have a headache indicating that the Botox has worn off. Frequency of treatment is every 12 weeks unless no response to the treatments, at which time we will discontinue the injections.     Otherwise, information below is still accurate and current.      05/16/2024- Interval History:  Risks, Benefits, and potential side effects of Botox discussed with patient.  Alternative treatments offered.  After thorough discussed regarding the procedure, patient has decided to move forward with initiating Botox injections for Chronic Migraine.  Patient understands we will complete 3 rounds of injections, if after 3 rounds, we do not see a 50% reduction in headaches, we will discontinue Botox  injections.   She continues to experience daily headaches with full blown migraines at least 15 days per month.     Otherwise, information below is still accurate and current.      History of Present Illness:   18 y.o. female with chronic migraine, ADD, obesity, who presents to clinic with her mother for evaluation of headaches.  Previous patient of Dr. Chavez, she is a new patient to me.  She has continued to experience near daily headaches with migraines at least 15 days per month.   She has tried nurtec and ubrelvy, both of which she feels helps, feels ubrelvy is more effective than nurtec.  She has taken multiple preventive options over the years including topiramate, amitriptyline.  Was previously recommended to proceed with Botox, did not proceed with botox in the past, is ready to begin therapy now.      Notes from Dr. Chavez:  Chief Complaint: Headache     The patient is a 16 y/o female with long history of migraine, previously seen by a child neurologist last in 2017 who is retired now. She reports having been treated with a medication that also works for anxiety. She used topamax which initially helped but her headaches have started to increase in frequency. She has also tried Ubrelvy and Rizatriptan (both of which were supplied by the patient's mom). Ubrelvy and rizatriptan lowers the intensity of the headache.     Headaches are bifrontal, pressure like, throbbing. No particular time of the day, lasting hours to days.      In the past 30 days, 2 headache-FREE days, 28 HA days (mostly moderate 5-7/10, 4-5 instances of severe 8-9/10 headaches). They've become daily in the past month and prior to that she had 4-5      Strong maternal history of migraines, with multiple siblings living with migraines.        Headache History:  Headache questionnaire     Headache Impact Test (HIT-6): 55     1. When did your Headaches start?               Approximately 2013 (age of 8)     2. Where are your headaches located?               Mostly frontal, bilaterally     3. Your headache's characteristics:              Pressure, Like a tight band, Throbbing/Pounding, Sharp     4. How long does the headache last?              Hours-Days      5. How often does the headache occur?              daily     6. Are your headaches preceded or accompanied by other symptoms? yes              If yes, please describe.  Nausea, light sensitivity        7. Does the headache awaken you at night? yes              If so, how often? Once a week     8. Please ehsan the word that best describes your headache's intensity:               moderate     9. Using a scale of 1 through 10, with 0 = no pain and 10 = the worst pain:              What score is your headache now? 6              What score is your headache at its worst? 8              What score is your headache at its best? 3           10. Possible associated headache symptoms:  [x]  Sensitivity to light              [x] Dizziness                [] Nasal or sinus pressure/ pain          [x] Sensitivity to noise             [] Vertigo                    [x] Problems with concentration  [] Sensitivity to smells           [x] Ringing in ears       [] Problems with memory                    [x] Blurred vision                     [] Irritability                 [] Problems with task completion   [] Double vision                     [] Anger                      [x]  Problems with relaxation  [x] Loss of appetite                  [] Anxiety                   [x] Neck tightness, Neck pain  [x] Nausea                               [x] Nasal congestion  [] Vomiting                                     11. Headache improving factors:  [x] Sleep                      [] Heat  [x] Darkness                [x] Ice  [] Local pressure       [] Menses (period)  [] Massage                 [] Medications:          12. Headache worsening factors:   [] Fatigue       [x] Sneezing                [x] Changes in Weather  [x] Light            [] Bending Over         [] Stress  [x] Noise          [] Ovulation                [] Multiple Sclerosis Flare-Up  [] Smells        [] Menses                   [] Food   [] Coughing    [] Alcohol        13. Number of caffeinated drinks per day: 1 every other day        14. Number of diet drinks per day:  1 every other week        15. Have you seen any other [facility]ists within the last 3 years?  no    Current Medications:  Current Medications[1]    Review of Systems - A review of 10+ systems was conducted with pertinent positive and negative findings documented in HPI with all other systems reviewed and negative.    PFSH: Past medical, family, and social history reviewed as documented in chart with pertinent positive medical, family, and social history detailed in HPI.    OBJECTIVE:  Vitals:  /84 (BP Location: Left arm, Patient Position: Sitting)   Pulse 106     Physical Exam:  Constitutional: she appears well-developed and well-nourished. she is well groomed. NAD     Review of Data:   Notes from GYN reviewed.   Labs:  Lab Visit on 02/21/2025   Component Date Value Ref Range Status    TSH 02/21/2025 1.309  0.400 - 4.000 uIU/mL Final    Prolactin 02/21/2025 8.8  5.2 - 26.5 ng/mL Final    Testosterone, Total 02/21/2025 60  5 - 73 ng/dL Final    Testosterone, Free 02/21/2025 1.8  pg/mL Final    17-Hydroxyprogesterone 02/21/2025 116  35 - 413 ng/dL Final    Follicle Stimulating Hormone 02/21/2025 6.12  See Text mIU/mL Final    Estradiol 02/21/2025 78  See Text pg/mL Final   Office Visit on 02/21/2025   Component Date Value Ref Range Status    Final Pathologic Diagnosis 02/21/2025    Final                    Value:Specimen Adequacy  Satisfactory for interpretation. Endocervical component is absent.    [place] Category  Negative for intraepithelial lesion or malignancy.      Disclaimer 02/21/2025    Final                    Value:The Pap smear is a screening test that aids in the detection of cervical  cancer and cancer precursors. Both false positive and false negative results can occur. The test should be used at regular intervals, and positive results should be confirmed before   definitive therapy.  This liquid based specimen is processed using the , [employer], or  [employer] Thin PrepPAP System. This specimen has been analyzed by the ThinPrep Imaging System ([employer]), an automated imaging and review system which assists the laboratory in   evaluating cells on ThinPrep PAP tests. Following automated imaging, selected fields from every slide are reviewed by a cytotechnologist and/or pathologist.     Screening was performed at [facility], [address], [place] [address].     Lab Visit on 10/07/2024   Component Date Value Ref Range Status    Insulin 10/07/2024 11.6  <25.0 uU/mL Final    Insulin Collection Interval 10/07/2024 n/a   Final    Hemoglobin A1C 10/07/2024 5.2  4.0 - 5.6 % Final    Estimated Avg Glucose 10/07/2024 103  68 - 131 mg/dL Final    TSH 10/07/2024 0.970  0.400 - 4.000 uIU/mL Final    Cholesterol 10/07/2024 181  120 - 199 mg/dL Final    Triglycerides 10/07/2024 141  30 - 150 mg/dL Final    HDL 10/07/2024 42  40 - 75 mg/dL Final    LDL Cholesterol 10/07/2024 110.8  63.0 - 159.0 mg/dL Final    HDL/Cholesterol Ratio 10/07/2024 23.2  20.0 - 50.0 % Final    Total Cholesterol/HDL Ratio 10/07/2024 4.3  2.0 - 5.0 Final    Non-HDL Cholesterol 10/07/2024 139  mg/dL Final    Sodium 10/07/2024 143  136 - 145 mmol/L Final    Potassium 10/07/2024 4.4  3.5 - 5.1 mmol/L Final    Chloride 10/07/2024 107  95 - 110 mmol/L Final    CO2 10/07/2024 23  23 - 29 mmol/L Final    Glucose 10/07/2024 78  70 - 110 mg/dL Final    BUN 10/07/2024 10  6 - 20 mg/dL Final    Creatinine 10/07/2024 0.7  0.5 - 1.4 mg/dL Final    Calcium 10/07/2024 10.0  8.7 - 10.5 mg/dL Final    Total Protein 10/07/2024 7.6  6.0 - 8.4 g/dL Final    Albumin 10/07/2024 3.4 (L)  3.5 - 5.2  g/dL Final    Total Bilirubin 10/07/2024 0.3  0.1 - 1.0 mg/dL Final    Alkaline Phosphatase 10/07/2024 42 (L)  55 - 135 U/L Final    AST 10/07/2024 23  10 - 40 U/L Final    ALT 10/07/2024 18  10 - 44 U/L Final    eGFR 10/07/2024 >60.0  >60 mL/min/1.73 m^2 Final    Anion Gap 10/07/2024 13  8 - 16 mmol/L Final    WBC 10/07/2024 12.52  3.90 - 12.70 K/uL Final    RBC 10/07/2024 4.51  4.00 - 5.40 M/uL Final    Hemoglobin 10/07/2024 12.0  12.0 - 16.0 g/dL Final    Hematocrit 10/07/2024 38.8  37.0 - 48.5 % Final    MCV 10/07/2024 86  82 - 98 fL Final    MCH 10/07/2024 26.6 (L)  27.0 - 31.0 pg Final    MCHC 10/07/2024 30.9 (L)  32.0 - 36.0 g/dL Final    RDW 10/07/2024 13.3  11.5 - 14.5 % Final    Platelets 10/07/2024 424  150 - 450 K/uL Final    MPV 10/07/2024 9.1 (L)  9.2 - 12.9 fL Final    Immature Granulocytes 10/07/2024 0.3  0.0 - 0.5 % Final    Gran # (ANC) 10/07/2024 9.2 (H)  1.8 - 7.7 K/uL Final    Immature Grans (Abs) 10/07/2024 0.04  0.00 - 0.04 K/uL Final    Lymph # 10/07/2024 2.4  1.0 - 4.8 K/uL Final    Mono # 10/07/2024 0.7  0.3 - 1.0 K/uL Final    Eos # 10/07/2024 0.2  0.0 - 0.5 K/uL Final    Baso # 10/07/2024 0.05  0.00 - 0.20 K/uL Final    nRBC 10/07/2024 0  0 /100 WBC Final    Gran % 10/07/2024 73.1 (H)  38.0 - 73.0 % Final    Lymph % 10/07/2024 19.0  18.0 - 48.0 % Final    Mono % 10/07/2024 5.7  4.0 - 15.0 % Final    Eosinophil % 10/07/2024 1.5  0.0 - 8.0 % Final    Basophil % 10/07/2024 0.4  0.0 - 1.9 % Final    Differential Method 10/07/2024 Automated   Final     Imaging:  Results for orders placed or performed during the hospital encounter of 08/09/17   MRI Brain Without Contrast    Narrative    MRI brain without contrast    08/09/17 14:44:31    Accession# [id number]    CLINICAL INDICATION: 12 year old F with headache and visual disturbance.    TECHNIQUE: Multiplanar multisequence MR imaging of the brain was performed without the use of intravenous contrast.    COMPARISON: No priors.    FINDINGS:    The  ventricles are normal in size for age, without evidence of hydrocephalus.    The brain appears within normal limits. No parenchymal mass, hemorrhage, edema or infarction.    No extra-axial blood or fluid collections.    The T2 skull base flow voids are preserved. Bone marrow signal intensity is unremarkable.    Impression    No evidence of acute intracranial pathology. ______________________________________     Electronically signed by resident: GINNA LONGORIA MD  Date:     08/09/17  Time:    16:06            As the supervising and teaching physician, I personally reviewed the images and resident's interpretation and I agree with the findings.          Electronically signed by: MARCUS MIRZA MD  Date:     08/09/17  Time:    16:28        Note: I have independently reviewed any/all imaging/labs/tests and agree with the report (s) as documented.  Any discrepancies will be as noted/demarcated by free text.  LANCE, THERON-C 4/3/2025    ASSESSMENT:  1. Chronic migraine without aura, intractable, without status migrainosus    2. Intractable migraine without aura and without status migrainosus      PLAN:  - Botox administered in clinic for Chronic Migraine (see below)   - For acute migraines - ubrelvy 100 mg   - For nausea - zofran 8 mg odt   - refills provided   - RTC in 12 weeks for repeat Botox injections or sooner if needed          All questions and concerns addressed.  Patient verbalizes understanding and is agreeable with the above stated treatment plan.      PROCEDURE NOTE:  BOTOX was performed as an indicated therapy for intractable chronic migraine headaches given that the patient failed more than 2 headache medications    A time out was conducted just before the start of the procedure to verify the correct patient and procedure, procedure location, and all relevant critical information.     The Botox injections have achieved well over 50%  improvement in the patient's symptoms. Migraines have been reduced at least 7  days per month and the number of cumulative hours suffering with headaches has been reduced at least 100 total hours per month. Today she does have a headache indicating that the Botox has worn off. Frequency of treatment is every 12 weeks unless no response to the treatments, at which time we will discontinue the injections.    PROCEDURE PERFORMED: Botulinum toxin injection (54248)  CLINICAL INDICATION: G43.719  After risks and benefits were explained including bleeding, infection, worsening of pain, damage to the areas being injected, weakness of muscles, loss of muscle control, dysphagia if injecting the head or neck, facial droop if injecting the facial area, painful injection, allergic or other reaction to the medications being injected, and the failure of the procedure to help the problem, a signed consent was obtained.   The patient was placed in a comfortable area and the sites to be treated were identified.The area to be treated was prepped three times with alcohol and the alcohol allowed to dry. Next, a 30 gauge needle was used to inject the medication in the area to be treated.      Total Botox used: 155 Units   Unavoidable waste: 45 Units     Injection sites:    muscle bilaterally ( a total of 10 units divided into 2 sites)   Procerus muscle (5 units)   Frontalis muscle bilaterally (a total of 20 units divided into 4 sites)   Temporalis muscle bilaterally (a total of 40 units divided into 8 sites)   Occipitalis muscle bilaterally (a total of 30 units divided into 6 sites)   Cervical paraspinal muscles (a total of 20 units divided into 4 sites)   Trapezius muscle bilaterally (a total of 30 units divided into 6 sites)   Complications: none   RTC for the next Botox injection: 12 weeks     CC: Bre Castillo MD Elizabeth C Vulevich, FNP-VIKRAM  [facility] Department of [facility]   [phone]           [1]   Current Outpatient Medications:     albuterol (PROVENTIL/VENTOLIN HFA) 90  mcg/actuation inhaler, Inhale 2 puffs into the lungs every 4 (four) hours as needed for Wheezing., Disp: 8.5 g, Rfl: 2    clindamycin (CLEOCIN T) 1 % external solution, Apply twice a day to bumps on scalp, Disp: 60 mL, Rfl: 4    clotrimazole-betamethasone (LOTRISONE) lotion, Apply topically 2 (two) times daily., Disp: 30 mL, Rfl: 0    inhalation spacing device, Use as directed for inhalation., Disp: 1 Device, Rfl: 0    metFORMIN (GLUCOPHAGE) 500 MG tablet, Take 1 tablet (500 mg total) by mouth 2 (two) times daily with meals., Disp: 180 tablet, Rfl: 3    mupirocin (BACTROBAN) 2 % ointment, Apply topically 3 (three) times daily., Disp: 1 Tube, Rfl: 0    norgestimate-ethinyl estradioL (ESTARYLLA) 0.25-35 mg-mcg per tablet, Take 1 tablet by mouth once daily., Disp: 84 tablet, Rfl: 0    omeprazole (PRILOSEC) 40 MG capsule, Take 1 capsule (40 mg total) by mouth every morning., Disp: 90 capsule, Rfl: 2    ondansetron (ZOFRAN-ODT) 8 MG TbDL, Dissolve 1 tablet (8 mg total) on the tongue every 8 (eight) hours as needed (nausea)., Disp: 30 tablet, Rfl: 2    triamcinolone acetonide 0.1% (KENALOG) 0.1 % cream, Apply topically 2 (two) times daily., Disp: 80 g, Rfl: 3    triamcinolone acetonide 0.1% (KENALOG) 0.1 % ointment, Apply topically 2 (two) times daily., Disp: 30 g, Rfl: 1    ubrogepant (UBRELVY) 100 mg tablet, Take 1 tablet (100 mg total) by mouth every 2 (two) hours as needed for Migraine. If symptoms persist or return, may repeat dose after 2 hours. Maximum: 200 mg per 24 hours, Disp: 16 tablet, Rfl: 2    Current Facility-Administered Medications:     onabotulinumtoxina injection 200 Units, 200 Units, Intramuscular, q12 weeks, , 200 Units at 01/16/25 1100

## 2025-04-11 ENCOUNTER — TELEPHONE (OUTPATIENT)
Dept: HEMATOLOGY/ONCOLOGY | Facility: CLINIC | Age: 20
End: 2025-04-11
Payer: COMMERCIAL

## 2025-04-11 DIAGNOSIS — F90.0 ATTENTION DEFICIT HYPERACTIVITY DISORDER (ADHD), PREDOMINANTLY INATTENTIVE TYPE: Primary | ICD-10-CM

## 2025-05-12 NOTE — TELEPHONE ENCOUNTER
PN visit  25w5d  L&D triage 5/5/25; had spotting  US done  Level II US completed 4/25/25  28 week lab orders placed  She has headaches, but not as frequent or severe, acid reflux with occasional vomiting   Loss of Fluid, vaginal bleeding, contractions?- denies  After exertion reports having lower abdominal pain- hx: Abdominoplasty.   + fetal movement  EPDS: 23  Zoloft?- not taking   Waiting for response from neurology.

## 2025-06-12 ENCOUNTER — PATIENT MESSAGE (OUTPATIENT)
Dept: PRIMARY CARE CLINIC | Facility: CLINIC | Age: 20
End: 2025-06-12
Payer: COMMERCIAL

## 2025-06-16 ENCOUNTER — HOSPITAL ENCOUNTER (OUTPATIENT)
Dept: RADIOLOGY | Facility: HOSPITAL | Age: 20
Discharge: HOME | End: 2025-06-16
Attending: STUDENT IN AN ORGANIZED HEALTH CARE EDUCATION/TRAINING PROGRAM
Payer: COMMERCIAL

## 2025-06-16 DIAGNOSIS — N93.9 ABNORMAL UTERINE BLEEDING (AUB): ICD-10-CM

## 2025-06-16 PROCEDURE — 76856 US EXAM PELVIC COMPLETE: CPT | Mod: TC

## 2025-06-16 PROCEDURE — 76830 TRANSVAGINAL US NON-OB: CPT | Mod: 26,,, | Performed by: STUDENT IN AN ORGANIZED HEALTH CARE EDUCATION/TRAINING PROGRAM

## 2025-06-16 PROCEDURE — 76856 US EXAM PELVIC COMPLETE: CPT | Mod: 26,,, | Performed by: STUDENT IN AN ORGANIZED HEALTH CARE EDUCATION/TRAINING PROGRAM

## 2025-06-17 ENCOUNTER — TELEPHONE (OUTPATIENT)
Dept: OBSTETRICS AND GYNECOLOGY | Facility: CLINIC | Age: 20
End: 2025-06-17
Payer: COMMERCIAL

## 2025-06-19 ENCOUNTER — PROCEDURE VISIT (OUTPATIENT)
Dept: NEUROLOGY | Facility: CLINIC | Age: 20
End: 2025-06-19
Payer: COMMERCIAL

## 2025-06-19 VITALS
BODY MASS INDEX: 50.89 KG/M2 | DIASTOLIC BLOOD PRESSURE: 77 MMHG | SYSTOLIC BLOOD PRESSURE: 115 MMHG | HEART RATE: 96 BPM | WEIGHT: 293 LBS

## 2025-06-19 DIAGNOSIS — G43.719 CHRONIC MIGRAINE WITHOUT AURA, INTRACTABLE, WITHOUT STATUS MIGRAINOSUS: Primary | ICD-10-CM

## 2025-06-19 NOTE — PROCEDURES
PROCEDURE NOTE:  BOTOX was performed as an indicated therapy for intractable chronic migraine headaches given that the patient failed more than 2 headache medications    A time out was conducted just before the start of the procedure to verify the correct patient and procedure, procedure location, and all relevant critical information.     The Botox injections have achieved well over 50%  improvement in the patient's symptoms. Migraines have been reduced at least 7 days per month and the number of cumulative hours suffering with headaches has been reduced at least 100 total hours per month. Today she does have a headache indicating that the Botox has worn off. Frequency of treatment is every 12 weeks unless no response to the treatments, at which time we will discontinue the injections.    PROCEDURE PERFORMED: Botulinum toxin injection (98152)  CLINICAL INDICATION: G43.719  After risks and benefits were explained including bleeding, infection, worsening of pain, damage to the areas being injected, weakness of muscles, loss of muscle control, dysphagia if injecting the head or neck, facial droop if injecting the facial area, painful injection, allergic or other reaction to the medications being injected, and the failure of the procedure to help the problem, a signed consent was obtained.   The patient was placed in a comfortable area and the sites to be treated were identified.The area to be treated was prepped three times with alcohol and the alcohol allowed to dry. Next, a 30 gauge needle was used to inject the medication in the area to be treated.      Total Botox used: 155 Units   Unavoidable waste: 45 Units     Injection sites:    muscle bilaterally ( a total of 10 units divided into 2 sites)   Procerus muscle (5 units)   Frontalis muscle bilaterally (a total of 20 units divided into 4 sites)   Temporalis muscle bilaterally (a total of 40 units divided into 8 sites)   Occipitalis muscle bilaterally (a  total of 30 units divided into 6 sites)   Cervical paraspinal muscles (a total of 20 units divided into 4 sites)   Trapezius muscle bilaterally (a total of 30 units divided into 6 sites)   Complications: none   RTC for the next Botox injection: 45 weeks     CC: Bre Castillo MD Elizabeth C Vulevich, FNP-C  [facility] Department of Neurology   [phone]

## 2025-08-04 ENCOUNTER — PATIENT MESSAGE (OUTPATIENT)
Dept: HEMATOLOGY/ONCOLOGY | Facility: CLINIC | Age: 20
End: 2025-08-04
Payer: COMMERCIAL

## 2025-08-04 ENCOUNTER — DOCUMENTATION ONLY (OUTPATIENT)
Dept: HEMATOLOGY/ONCOLOGY | Facility: CLINIC | Age: 20
End: 2025-08-04
Payer: COMMERCIAL

## 2025-08-04 NOTE — PROGRESS NOTES
"Pharmacogenomics (PGx) Consult     Chief Complaint: Kalyn Kirby  has undergone pharmacogenomic testing via self-enrollment in the [employer] PGx . Patient completed sample collection on 7/23/2025 and results were provided on 8/4/2025.     Test results: Pharmacogenomic results can be found in GENOMIC INDICATORS    Past Medical History:  -------------------------------------    Allergy    Asthma    Cough    Eczema    Headache(784.0)    Obesity    Rhinitis    seasonal    Snoring        Allergies:   Review of patient's allergies indicates:   Allergen Reactions    No known drug allergies         Medications: Current Medications[1]      Clinical Recommendations based on PGx    -Patient is a poor metabolizer of omeprazole which may increase their risk of developing chronic side effects due to likely increased plasma concentrations. As patient has been taking omeprazole 40 mg daily for >12 weeks (per chart review, started ~2018), could consider reducing the dose of omperazole 40 mg daily to omeprazole 20 mg daily or switching to esomperazole/rabeprazole as these are NOT as impacted by her poor metabolism.    -Of note, if patient previously experienced side effects with previous use of amitriptyline, this could possibly be explained by her [id number] poor metabolism and CYP2D6 intermediate metabolism.    -None of the patient's other current medications are likely impacted by their pharmacogenomic results     Future Medication Considerations based on PGx    -Patient has 3 PGx results that can impact future medication selection- please see "Clinically Actionable PGx Results" for full list of medication interactions    -For future antidepressant prescribing recommendations, use the "Antidepressants with PGx" Smart Set under Orders to view personalized antidepressant recommendations based on the patients Pgx    -If there is an interaction with any future medications and the patient's genetic results, a clinical " "alert will fire for both providers and pharmacists. These interactions are reviewed by the PGx team and updated as new data becomes available.      -PGx information can be accessed by patients within [facility]- results with "actionable result DETECTED" should be communicated to providers outside of [facility] as our clinical alerts will NOT fire    Clinically Actionable PGx Results   *Note, actionable recommendations may change based on updates to existing PGx literature. For the most up to date medication recommendations based on patients' results, please view the GENOMIC INDICATORS module in Epic.      CYP2B6 Poor Metabolizer       Genomic results predict that this patient may metabolize CYP2B6 substrates at a rate that falls greatly below average metabolic capacity or approaches zero. Thus, this patient may have an increased risk of adverse or poor response to medications metabolized by CYP2B6. To avoid these types of responses, dose adjustments or alternative therapeutic agents may be necessary when medications metabolized by CYP2B6 are being considered. For questions, call [phone] ([phone]) or order PGx Consult [[id number]].         Poor Metabolizer of Efavirenz          Genetic results for this patient indicate an increased risk of CNS adverse events with a standard dose of efavirenz based on CYP2B6 genotype. Consider reducing the starting dose to 400 or 200 mg/day.     See [facility] clinical guidelines for more information.                 Poor Metabolizer of Sertraline          This patient has UOW7X49 and/or CYP2B6 genomic indicators which indicate altered metabolism of sertraline.    Consider therapeutic recommendations from [facility] clinical guidelines.                       [id number] Poor Metabolizer       Genomic results predict that this patient may metabolize SXO5L10 substrates at a rate that falls greatly below average metabolic capacity or approaches zero. Thus, this patient may have an increased risk of adverse or " poor response to medications metabolized by JML3A38. To avoid these types of responses, dose adjustments or alternative therapeutic agents may be necessary when medications metabolized by MST8S06 are being considered. For questions, call [phone] ([phone]) or order PGx Consult [[id number]].         Poor Metabolizer of Clopidogrel        Genetic results for this patient indicate increased risk of treatment failure with clopidogrel (PLAVIX), based on GMF3W71 genotype. Consider an alternative drug such as prasugrel or ticagrelor.     See [facility] clinical guidelines for more information.               Poor Metabolizer of Voriconazole        This patient has a DCB7S50 genomic indicator which indicates reduced metabolism of voriconazole and increased probability of adverse events when treated with the drug. Consider an alternative drug not dependent on CXZ4O42 metabolism, such as isavuconazole, liposomal amphotericin B, or posaconazole.     See [facility] clinical guidelines for more information.                  Poor Metabolizer of Amitriptyline        This patient has HHF2G25 and/or CYP2D6 genomic indicators which indicate altered metabolism of tricyclic antidepressants such as amitriptyline and potential for lack of efficacy. Consider an alternative drug not metabolized by YTL7T58/CYP2D6.     See [facility] clinical guidelines for more information.                Poor Metabolizer of Citalopram        This patient has a KDM5Y51 genomic indicator which indicates reduced metabolism of citalopram/escitalopram and increased probability of side effects. Consider an alternative drug not predominantly metabolized by NZJ7K63.     See [facility] clinical guidelines for more information.                Poor Metabolizer of Dexlansoprazole        This patient has a PWZ7Z72 genomic indicator which indicates reduced metabolism of PPIs and increased risk of side effects. For chronic therapy (>12 weeks) consider 50% reduction of dose.     See [facility] clinical  guidelines for more information.                Poor Metabolizer of Escitalopram        This patient has a GKV7Z03 genomic indicator which indicates reduced metabolism of citalopram/escitalopram and increased probability of side effects. Consider an alternative drug not predominantly metabolized by IMN5R82.     See [facility] clinical guidelines for more information.                Poor Metabolizer of Lansoprazole        This patient has a VHT3K37 genomic indicator which indicates reduced metabolism of PPIs and increased risk of side effects. For chronic therapy (>12 weeks) consider 50% reduction of dose.     See [facility] clinical guidelines for more information.                Poor Metabolizer of Omeprazole        This patient has a ZYJ4M39 genomic indicator which indicates reduced metabolism of PPIs and increased risk of side effects. For chronic therapy (>12 weeks) consider 50% reduction of dose.     See [facility] clinical guidelines for more information.                Poor Metabolizer of Pantoprazole        This patient has a JCI9L77 genomic indicator which indicates reduced metabolism of PPIs and increased risk of side effects. For chronic therapy (>12 weeks) consider 50% reduction of dose.     See [facility] clinical guidelines for more information.                Poor Metabolizer of Sertraline        This patient has WHE2V41 and/or CYP2B6 genomic indicators which indicate altered metabolism of sertraline.     Consider therapeutic recommendations from [facility] clinical guidelines.                     CYP2D6 Intermediate Metabolizer       Genomic results predict that this patient may metabolize CYP2D6 substrates at a rate that falls below average metabolic capacity. Thus, this patient may have an increased risk of adverse or poor response to medications metabolized by CYP2D6. To avoid these types of responses, dose adjustments or alternative therapeutic agents may be necessary when medications metabolized by CYP2D6 are being  considered. For questions, call [phone] ([phone]) or order PGx Consult [[id number]].         Intermediate Metabolizer of Amitriptyline        This patient has a CYP2D6 genomic indicator which indicates reduced metabolism of tricyclic antidepressants such as amitriptyline and potential for side effects. Consider an alternative drug or reduction in recommended starting dose.     See [facility] clinical guidelines for more information.               Intermediate Metabolizer of Atomoxetine        This patient has a CYP2D6 genomic indicator which indicates reduced metabolism of atomoxetine. Initiate with usual starting dose and wait 2 weeks for clinical response/adverse effects before changing dose.     See [facility] clinical guidelines for more information.                Intermediate Metabolizer of Codeine        This patient has a CYP2D6 genomic indicator which indicates reduced metabolism of codeine and potential for insufficient pain relief when treated with codeine. Use normal doses of codeine and if no response, consider an alternative non-codeine opioid.     See [facility] clinical guidelines for more information.               Intermediate Metabolizer of Nortriptyline        This patient has a CYP2D6 genomic indicator which indicates reduced metabolism of tricyclic antidepressants (TCAs) such as nortriptyline and increased probability of side effects. Consider an alternative drug or reduction in starting dose.     See [facility] clinical guidelines for more information.               Intermediate Metabolizer of Tamoxifen        This patient has a CYP2D6 genomic indicator which indicates lower endoxifen concentrations and higher risk of breast cancer recurrence. Consider an alternative hormonal therapy.     See [facility] clinical guidelines for more information.               Intermediate Metabolizer of Tramadol        This patient has a CYP2D6 genomic indicator which indicates reduced metabolism of tramadol and potential for insufficient  pain relief when treated with tramadol. Use normal doses of tramadol and if no response, consider an alternative non-codeine opioid.     See [facility] clinical guidelines for more information.                -For any additional questions, please don't hesitate to reach out to me or contact [phone] ([phone]).    Olya Lucero, PharmD  Clinical Pharmacogenomics Pharmacist                 [1]   albuterol (PROVENTIL/VENTOLIN HFA) 90 mcg/actuation inhaler    clindamycin (CLEOCIN T) 1 % external solution    clotrimazole-betamethasone (LOTRISONE) lotion    inhalation spacing device    metFORMIN (GLUCOPHAGE) 500 MG tablet    mupirocin (BACTROBAN) 2 % ointment    norgestimate-ethinyl estradioL (ESTARYLLA) 0.25-35 mg-mcg per tablet    omeprazole (PRILOSEC) 40 MG capsule    ondansetron (ZOFRAN-ODT) 8 MG TbDL    triamcinolone acetonide 0.1% (KENALOG) 0.1 % cream    triamcinolone acetonide 0.1% (KENALOG) 0.1 % ointment    ubrogepant (UBRELVY) 100 mg tablet    onabotulinumtoxina injection 200 Units

## 2025-08-04 NOTE — Clinical Note
Hello,   Just wanted to bring to your awareness that Kalyn Kirby  had pharmacogenomics (PGx) testing done through a  initiative [facility] is currently offering for select patients who are taking medications that may be impacted by genetic results.   As part of this , I have reviewed their genetic results and current medications to help provide insight into current medication optimization options, as well as future medication choices as appropriate.   For any future medications prescribed by any [facility] provider, if there is an interaction with the medication and the patient's genetic results, a clinical alert will fire for both providers and pharmacists. These interactions are reviewed by the PGx team monthly and updated as new data becomes available.   If you have any questions about the results or PGx at [facility] in general, please don't hesitate to reach out to me directly.   Olya Chung, PharmD Clinical Pharmacogenomics Pharmacist

## 2025-08-07 ENCOUNTER — OFFICE VISIT (OUTPATIENT)
Dept: PODIATRY | Facility: CLINIC | Age: 20
End: 2025-08-07
Payer: COMMERCIAL

## 2025-08-07 VITALS
HEIGHT: 69 IN | DIASTOLIC BLOOD PRESSURE: 83 MMHG | HEART RATE: 92 BPM | BODY MASS INDEX: 43.4 KG/M2 | WEIGHT: 293 LBS | SYSTOLIC BLOOD PRESSURE: 131 MMHG

## 2025-08-07 DIAGNOSIS — L60.0 INGROWN NAIL: Primary | Chronic | ICD-10-CM

## 2025-08-07 DIAGNOSIS — M79.674 TOE PAIN, RIGHT: ICD-10-CM

## 2025-08-07 PROCEDURE — 99999 PR PBB SHADOW E&M-EST. PATIENT-LVL IV: CPT | Mod: PBBFAC,,, | Performed by: PODIATRIST

## 2025-08-07 PROCEDURE — 1159F MED LIST DOCD IN RCRD: CPT | Mod: CPTII,S$GLB,, | Performed by: PODIATRIST

## 2025-08-07 PROCEDURE — 3075F SYST BP GE 130 - 139MM HG: CPT | Mod: CPTII,S$GLB,, | Performed by: PODIATRIST

## 2025-08-07 PROCEDURE — 3079F DIAST BP 80-89 MM HG: CPT | Mod: CPTII,S$GLB,, | Performed by: PODIATRIST

## 2025-08-07 PROCEDURE — 99213 OFFICE O/P EST LOW 20 MIN: CPT | Mod: S$GLB,,, | Performed by: PODIATRIST

## 2025-08-07 PROCEDURE — 3008F BODY MASS INDEX DOCD: CPT | Mod: CPTII,S$GLB,, | Performed by: PODIATRIST

## 2025-08-07 PROCEDURE — 1160F RVW MEDS BY RX/DR IN RCRD: CPT | Mod: CPTII,S$GLB,, | Performed by: PODIATRIST

## 2025-08-07 NOTE — PROGRESS NOTES
CHIEF CONCERN: Ingrown Toenail (Right Great big toe /1 week ago duration )       History of Present Illness    Right great toe pain due to an ingrown toenail.    Patient presents with concerns about an ingrown toenail on the right great toe. The side nail is growing into the skin, causing pain. She usually manages this by clipping the nail but has been unable to do so recently. Pain has been particularly bothersome for about a week. She denies any recent injury to the area.    In 2018, she had a procedure done on both sides of both great toes. During that procedure, multiple numbing shots were administered but were ineffective, causing her to feel pain. The doctor had to switch to a different anesthetic and redo the cut.     She expresses interest in potentially having another procedure to remove the problematic part of the nail permanently.       Patient Active Problem List  Diagnosis    ADD (attention deficit disorder)    Mild intermittent asthma without complication    Bilateral headache    Family history of headaches    Hx of motion sickness    Adolescent idiopathic scoliosis of lumbar region    Back pain    Sacroiliac joint dysfunction of left side    Scoliosis    Patellofemoral pain syndrome of left knee    Diagnosis deferred    Class 3 severe obesity in adult, unspecified BMI, unspecified obesity type, unspecified whether serious comorbidity present    Gastroesophageal reflux disease    Chronic migraine without aura, intractable, without status migrainosus             Current Outpatient Medications on File Prior to Visit   Medication Sig Dispense Refill    albuterol (PROVENTIL/VENTOLIN HFA) 90 mcg/actuation inhaler Inhale 2 puffs into the lungs every 4 (four) hours as needed for Wheezing. 8.5 g 2    clindamycin (CLEOCIN T) 1 % external solution Apply twice a day to bumps on scalp 60 mL 4    clotrimazole-betamethasone (LOTRISONE) lotion Apply topically 2 (two) times daily. 30 mL 0    inhalation spacing  "device Use as directed for inhalation. 1 Device 0    metFORMIN (GLUCOPHAGE) 500 MG tablet Take 1 tablet (500 mg total) by mouth 2 (two) times daily with meals. 180 tablet 3    mupirocin (BACTROBAN) 2 % ointment Apply topically 3 (three) times daily. 1 Tube 0    norgestimate-ethinyl estradioL (ESTARYLLA) 0.25-35 mg-mcg per tablet Take 1 tablet by mouth once daily. 84 tablet 0    omeprazole (PRILOSEC) 40 MG capsule Take 1 capsule (40 mg total) by mouth every morning. 90 capsule 2    ondansetron (ZOFRAN-ODT) 8 MG TbDL Dissolve 1 tablet (8 mg total) on the tongue every 8 (eight) hours as needed (nausea). 30 tablet 2    triamcinolone acetonide 0.1% (KENALOG) 0.1 % cream Apply topically 2 (two) times daily. 80 g 3    triamcinolone acetonide 0.1% (KENALOG) 0.1 % ointment Apply topically 2 (two) times daily. 30 g 1    ubrogepant (UBRELVY) 100 mg tablet Take 1 tablet (100 mg total) by mouth every 2 (two) hours as needed for Migraine. If symptoms persist or return, may repeat dose after 2 hours. Maximum: 200 mg per 24 hours 16 tablet 2     Current Facility-Administered Medications on File Prior to Visit   Medication Dose Route Frequency Provider Last Rate Last Admin    onabotulinumtoxina injection 200 Units  200 Units Intramuscular q12 weeks    200 Units at 06/19/25 1118            Review of patient's allergies indicates:   Allergen Reactions    No known drug allergies          ROS:  General ROS: negative for chills, fatigue or fever  Cardiovascular ROS: no chest pain or dyspnea on exertion  Musculoskeletal ROS: negative for - joint pain or joint stiffness.  Negative for loss of strength  Neuro ROS: Negative for syncope, numbness, or muscle weakness  Skin ROS: Negative for rash, itching or hair changes.  +Toenail changes        EXAM:   Vitals:    08/07/25 1239   BP: 131/83   Pulse: 92   Weight: (!) 156.3 kg (344 lb 9.3 oz)   Height: 5' 9" (1.753 m)       LOWER EXTREMITY EXAM:    Vascular:    Dorsalis pedis and posterior " tibial pulses are palpable. capillary refill time is within normal limits   Toes are warm to touch.    There is  presence of digital hair.    There is  no localized edema to the affected toe.     Neurological:    Light touch, proprioception, and sharp/dull sensation are all intact.   Mulders click:  Absent  Tinels:  Absent.   No LOPS    Dermatological:    The toenail is incurvated, Medial border of the right hallux.      no erythema noted to the affected area.     Absent paronychia.     Absent abscess      Musculoskeletal:    Muscle strength is 5/5 in all groups .    No swelling/crepitus at the interphalangeal joints.        ASSESSMENT/PLAN     Problem List Items Addressed This Visit    None  Visit Diagnoses         Ingrown nail  (Chronic)   -  Primary      Toe pain, right                  I counseled the patient on the patient's  conditions, their implications and medical management.   Exacerbation of chronic condition.   Nail trimmed for evaluation.   General nail care measures for abnormal nails include:  Keeping nails trimmed short, but avoid overzealous trimming  Avoiding trauma   Avoiding contact irritants   Keeping nails dry (avoiding wet work)  Avoiding all nail cosmetics  Wearing shoes that fit well at the toe box.  Avoid tight shoes.     Schedule for chemical matrixectomy.  Patient is amenable to plan.

## 2025-08-20 ENCOUNTER — HOSPITAL ENCOUNTER (OUTPATIENT)
Dept: RADIOLOGY | Facility: HOSPITAL | Age: 20
Discharge: HOME | End: 2025-08-20
Attending: PHYSICIAN ASSISTANT
Payer: COMMERCIAL

## 2025-08-20 ENCOUNTER — OFFICE VISIT (OUTPATIENT)
Dept: SPORTS MEDICINE | Facility: CLINIC | Age: 20
End: 2025-08-20
Payer: COMMERCIAL

## 2025-08-20 VITALS
HEART RATE: 91 BPM | DIASTOLIC BLOOD PRESSURE: 85 MMHG | WEIGHT: 293 LBS | BODY MASS INDEX: 50.79 KG/M2 | SYSTOLIC BLOOD PRESSURE: 127 MMHG

## 2025-08-20 DIAGNOSIS — M25.512 LEFT SHOULDER PAIN, UNSPECIFIED CHRONICITY: ICD-10-CM

## 2025-08-20 DIAGNOSIS — M75.22 BICEPS TENDONITIS ON LEFT: Primary | ICD-10-CM

## 2025-08-20 PROCEDURE — 3079F DIAST BP 80-89 MM HG: CPT | Mod: CPTII,S$GLB,, | Performed by: PHYSICIAN ASSISTANT

## 2025-08-20 PROCEDURE — 3074F SYST BP LT 130 MM HG: CPT | Mod: CPTII,S$GLB,, | Performed by: PHYSICIAN ASSISTANT

## 2025-08-20 PROCEDURE — 3008F BODY MASS INDEX DOCD: CPT | Mod: CPTII,S$GLB,, | Performed by: PHYSICIAN ASSISTANT

## 2025-08-20 PROCEDURE — 73030 X-RAY EXAM OF SHOULDER: CPT | Mod: TC,LT

## 2025-08-20 PROCEDURE — 73030 X-RAY EXAM OF SHOULDER: CPT | Mod: 26,LT,, | Performed by: RADIOLOGY

## 2025-08-20 PROCEDURE — 99203 OFFICE O/P NEW LOW 30 MIN: CPT | Mod: S$GLB,,, | Performed by: PHYSICIAN ASSISTANT

## 2025-08-20 PROCEDURE — 1160F RVW MEDS BY RX/DR IN RCRD: CPT | Mod: CPTII,S$GLB,, | Performed by: PHYSICIAN ASSISTANT

## 2025-08-20 PROCEDURE — 99999 PR PBB SHADOW E&M-EST. PATIENT-LVL IV: CPT | Mod: PBBFAC,,, | Performed by: PHYSICIAN ASSISTANT

## 2025-08-20 PROCEDURE — 1159F MED LIST DOCD IN RCRD: CPT | Mod: CPTII,S$GLB,, | Performed by: PHYSICIAN ASSISTANT

## 2025-08-20 RX ORDER — MELOXICAM 15 MG/1
15 TABLET ORAL DAILY
Qty: 30 TABLET | Refills: 2 | Status: SHIPPED | OUTPATIENT
Start: 2025-08-20